# Patient Record
Sex: MALE | Race: WHITE | Employment: UNEMPLOYED | URBAN - METROPOLITAN AREA
[De-identification: names, ages, dates, MRNs, and addresses within clinical notes are randomized per-mention and may not be internally consistent; named-entity substitution may affect disease eponyms.]

---

## 2018-04-19 ENCOUNTER — OFFICE VISIT (OUTPATIENT)
Dept: ENT CLINIC | Age: 53
End: 2018-04-19
Payer: COMMERCIAL

## 2018-04-19 ENCOUNTER — PROCEDURE VISIT (OUTPATIENT)
Dept: AUDIOLOGY | Age: 53
End: 2018-04-19
Payer: COMMERCIAL

## 2018-04-19 VITALS
WEIGHT: 225 LBS | DIASTOLIC BLOOD PRESSURE: 84 MMHG | HEIGHT: 74 IN | OXYGEN SATURATION: 95 % | SYSTOLIC BLOOD PRESSURE: 121 MMHG | HEART RATE: 73 BPM | BODY MASS INDEX: 28.88 KG/M2

## 2018-04-19 DIAGNOSIS — J34.89 NASAL OBSTRUCTION: ICD-10-CM

## 2018-04-19 DIAGNOSIS — H91.93 BILATERAL HEARING LOSS, UNSPECIFIED HEARING LOSS TYPE: ICD-10-CM

## 2018-04-19 DIAGNOSIS — J30.1 CHRONIC SEASONAL ALLERGIC RHINITIS DUE TO POLLEN: Primary | ICD-10-CM

## 2018-04-19 DIAGNOSIS — J32.9 CHRONIC SINUSITIS, UNSPECIFIED LOCATION: ICD-10-CM

## 2018-04-19 DIAGNOSIS — J34.2 DNS (DEVIATED NASAL SEPTUM): ICD-10-CM

## 2018-04-19 DIAGNOSIS — H93.13 TINNITUS OF BOTH EARS: ICD-10-CM

## 2018-04-19 DIAGNOSIS — H93.13 TINNITUS OF BOTH EARS: Primary | ICD-10-CM

## 2018-04-19 PROCEDURE — 3017F COLORECTAL CA SCREEN DOC REV: CPT | Performed by: OTOLARYNGOLOGY

## 2018-04-19 PROCEDURE — G8427 DOCREV CUR MEDS BY ELIG CLIN: HCPCS | Performed by: OTOLARYNGOLOGY

## 2018-04-19 PROCEDURE — 92567 TYMPANOMETRY: CPT | Performed by: AUDIOLOGIST

## 2018-04-19 PROCEDURE — 99204 OFFICE O/P NEW MOD 45 MIN: CPT | Performed by: OTOLARYNGOLOGY

## 2018-04-19 PROCEDURE — G8419 CALC BMI OUT NRM PARAM NOF/U: HCPCS | Performed by: OTOLARYNGOLOGY

## 2018-04-19 PROCEDURE — 92557 COMPREHENSIVE HEARING TEST: CPT | Performed by: AUDIOLOGIST

## 2018-04-19 PROCEDURE — 1036F TOBACCO NON-USER: CPT | Performed by: OTOLARYNGOLOGY

## 2018-04-19 RX ORDER — FLUTICASONE PROPIONATE 50 MCG
2 SPRAY, SUSPENSION (ML) NASAL DAILY
Qty: 1 BOTTLE | Refills: 3 | Status: SHIPPED | OUTPATIENT
Start: 2018-04-19 | End: 2018-08-06 | Stop reason: SDUPTHER

## 2018-04-19 ASSESSMENT — ENCOUNTER SYMPTOMS
COLOR CHANGE: 0
EYES NEGATIVE: 1
RHINORRHEA: 1
GASTROINTESTINAL NEGATIVE: 1
SHORTNESS OF BREATH: 0
RESPIRATORY NEGATIVE: 1
ABDOMINAL PAIN: 0
SINUS PRESSURE: 1

## 2018-04-23 ENCOUNTER — TELEPHONE (OUTPATIENT)
Dept: ENT CLINIC | Age: 53
End: 2018-04-23

## 2018-05-24 ENCOUNTER — HOSPITAL ENCOUNTER (OUTPATIENT)
Dept: CT IMAGING | Age: 53
Discharge: HOME | End: 2018-05-26
Payer: COMMERCIAL

## 2018-05-24 DIAGNOSIS — J34.89 NASAL OBSTRUCTION: ICD-10-CM

## 2018-05-24 DIAGNOSIS — J34.2 DNS (DEVIATED NASAL SEPTUM): ICD-10-CM

## 2018-05-24 PROCEDURE — 70486 CT MAXILLOFACIAL W/O DYE: CPT

## 2018-05-25 ENCOUNTER — APPOINTMENT (OUTPATIENT)
Dept: GENERAL RADIOLOGY | Age: 53
End: 2018-05-25
Payer: COMMERCIAL

## 2018-05-25 ENCOUNTER — HOSPITAL ENCOUNTER (EMERGENCY)
Age: 53
Discharge: HOME | End: 2018-05-25
Payer: COMMERCIAL

## 2018-05-25 VITALS
DIASTOLIC BLOOD PRESSURE: 80 MMHG | SYSTOLIC BLOOD PRESSURE: 125 MMHG | TEMPERATURE: 98.2 F | RESPIRATION RATE: 18 BRPM | OXYGEN SATURATION: 96 % | HEART RATE: 96 BPM

## 2018-05-25 DIAGNOSIS — S90.32XA CONTUSION OF LEFT FOOT, INITIAL ENCOUNTER: Primary | ICD-10-CM

## 2018-05-25 PROCEDURE — 6370000000 HC RX 637 (ALT 250 FOR IP): Performed by: NURSE PRACTITIONER

## 2018-05-25 PROCEDURE — 99283 EMERGENCY DEPT VISIT LOW MDM: CPT

## 2018-05-25 PROCEDURE — 73610 X-RAY EXAM OF ANKLE: CPT

## 2018-05-25 PROCEDURE — 73630 X-RAY EXAM OF FOOT: CPT

## 2018-05-25 RX ORDER — ACETAMINOPHEN 325 MG/1
650 TABLET ORAL EVERY 6 HOURS PRN
Qty: 30 TABLET | Refills: 0 | Status: SHIPPED | OUTPATIENT
Start: 2018-05-25 | End: 2018-07-17

## 2018-05-25 RX ORDER — ACETAMINOPHEN 500 MG
1000 TABLET ORAL ONCE
Status: COMPLETED | OUTPATIENT
Start: 2018-05-25 | End: 2018-05-25

## 2018-05-25 RX ADMIN — ACETAMINOPHEN 1000 MG: 500 TABLET ORAL at 15:47

## 2018-05-25 ASSESSMENT — PAIN DESCRIPTION - ORIENTATION: ORIENTATION: LEFT

## 2018-05-25 ASSESSMENT — PAIN SCALES - GENERAL
PAINLEVEL_OUTOF10: 10
PAINLEVEL_OUTOF10: 10

## 2018-05-25 ASSESSMENT — PAIN DESCRIPTION - LOCATION: LOCATION: FOOT

## 2018-05-25 ASSESSMENT — PAIN DESCRIPTION - DESCRIPTORS: DESCRIPTORS: ACHING

## 2018-05-31 ENCOUNTER — OFFICE VISIT (OUTPATIENT)
Dept: ENT CLINIC | Age: 53
End: 2018-05-31
Payer: COMMERCIAL

## 2018-05-31 VITALS
OXYGEN SATURATION: 95 % | WEIGHT: 235 LBS | BODY MASS INDEX: 30.16 KG/M2 | SYSTOLIC BLOOD PRESSURE: 139 MMHG | DIASTOLIC BLOOD PRESSURE: 85 MMHG | HEIGHT: 74 IN | HEART RATE: 93 BPM

## 2018-05-31 DIAGNOSIS — R51.9 FACIAL PAIN: ICD-10-CM

## 2018-05-31 DIAGNOSIS — J34.89 NASAL OBSTRUCTION: ICD-10-CM

## 2018-05-31 DIAGNOSIS — J34.2 DNS (DEVIATED NASAL SEPTUM): ICD-10-CM

## 2018-05-31 DIAGNOSIS — J30.1 CHRONIC SEASONAL ALLERGIC RHINITIS DUE TO POLLEN: Primary | ICD-10-CM

## 2018-05-31 PROCEDURE — G8427 DOCREV CUR MEDS BY ELIG CLIN: HCPCS | Performed by: OTOLARYNGOLOGY

## 2018-05-31 PROCEDURE — G8417 CALC BMI ABV UP PARAM F/U: HCPCS | Performed by: OTOLARYNGOLOGY

## 2018-05-31 PROCEDURE — 1036F TOBACCO NON-USER: CPT | Performed by: OTOLARYNGOLOGY

## 2018-05-31 PROCEDURE — 99213 OFFICE O/P EST LOW 20 MIN: CPT | Performed by: OTOLARYNGOLOGY

## 2018-05-31 PROCEDURE — 3017F COLORECTAL CA SCREEN DOC REV: CPT | Performed by: OTOLARYNGOLOGY

## 2018-05-31 ASSESSMENT — ENCOUNTER SYMPTOMS
GASTROINTESTINAL NEGATIVE: 1
ABDOMINAL PAIN: 0
RHINORRHEA: 1
RESPIRATORY NEGATIVE: 1
COLOR CHANGE: 0
SINUS PRESSURE: 1
SHORTNESS OF BREATH: 0
EYES NEGATIVE: 1

## 2018-06-06 ENCOUNTER — TELEPHONE (OUTPATIENT)
Dept: ENT CLINIC | Age: 53
End: 2018-06-06

## 2018-07-16 RX ORDER — POLYVINYL ALCOHOL 14 MG/ML
SOLUTION/ DROPS OPHTHALMIC
COMMUNITY
Start: 2018-06-01

## 2018-07-16 RX ORDER — KETOCONAZOLE 20 MG/ML
SHAMPOO TOPICAL
COMMUNITY
Start: 2018-05-23 | End: 2021-05-18

## 2018-07-16 RX ORDER — CETIRIZINE HYDROCHLORIDE 10 MG/1
TABLET ORAL
COMMUNITY
Start: 2018-05-04 | End: 2018-07-17

## 2018-07-16 RX ORDER — MONTELUKAST SODIUM 10 MG/1
TABLET ORAL
COMMUNITY
Start: 2018-05-04 | End: 2018-07-17

## 2018-07-16 RX ORDER — SULFACETAMIDE/PREDNISOLONE 10 %-0.2 %
OINTMENT (GRAM) OPHTHALMIC (EYE)
COMMUNITY
Start: 2018-06-04 | End: 2018-07-17

## 2018-07-17 RX ORDER — ASCORBIC ACID 1000 MG
TABLET ORAL DAILY
COMMUNITY

## 2018-07-17 RX ORDER — ATORVASTATIN CALCIUM 40 MG/1
40 TABLET, FILM COATED ORAL DAILY
COMMUNITY
End: 2021-05-18

## 2018-07-17 RX ORDER — BUPROPION HYDROCHLORIDE 150 MG/1
150 TABLET, EXTENDED RELEASE ORAL 2 TIMES DAILY
COMMUNITY
End: 2021-05-18

## 2018-07-17 NOTE — PROGRESS NOTES
Abdiaziz 34 PRE-ADMISSION TESTING GENERAL INSTRUCTIONS- [facility]-phone number:[phone]    GENERAL INSTRUCTIONS  [x] Antibacterial Soap shower Night before and/or AM of Surgery  [] Abner wipe instruction sheet and wipes given. [x] Nothing by mouth after midnight, including gum, candy, mints, or water. [x] You may brush your teeth, gargle, but do NOT swallow water. []Hibiclens shower  the night before and the morning of surgery. Do not use             Hibiclens on your face or head. [x]No smoking, chewing tobacco, illegal drugs, or alcohol within 24 hours of your surgery. [x] Jewelry, valuables or body piercing's should not be brought to the hospital. All body and/or tongue piercing's must be removed prior to arriving to hospital.  ALL hair pins must be removed. [] Do not wear makeup, lotions, powders, deodorant. Nail polish as directed by the nurse. [x] Arrange transportation to and from the hospital.  Arrange for someone to be with you for the remainder of the day and for 24 hours after your procedure due to having had anesthesia. [x] Bring insurance card and photo ID.  [] Transfusion Bracelet: Please bring with you to hospital, day of surgery  [] Bring urine specimen day of surgery. Any small container is acceptable. [] Use inhalers the morning of surgery and bring with you to hospital.   []Bring copy of living will or healthcare power of  papers to be placed in your electronic record. [] CPAP/BI-PAP: Please bring your machine if you are to spend the night in the hospital.     ENDOSCOPY INSTRUCTIONS:   [] Bowel prep instructions reviewed. [] Nothing by mouth after midnight, including gum, candy, mints, or water.  [] You may brush your teeth, gargle, but do NOT swallow water. [] Do not wear makeup, lotions, powders, deodorant. Nail polish as directed by the nurse.   [] Arrange transportation to and from the hospital.  Arrange for someone to be with you for the remainder of the day due to having had anesthesia. PARKING INSTRUCTIONS:   · [x] Arrival Time 0700[x] Parking lot 1 is located on [facility] (the corner of [facility] and [facility]). To enter, press the button and the gate will lift. A free token will be provided to exit the lot. One car per patient is allowed to park in this lot. All other cars are to park on [address] [place] [address] Street either in the parking garage or the handicap lot. [] Free  parking is available on [address] [place] [address] Street. · [] To reach the [facility] lobby from [address] [place] [address], upon entering the hospital, take elevator B to the 3rd floor. EDUCATION INSTRUCTIONS:      [] Knee or hip replacement booklet & exercise pamphlets given. [] Braydon Rendon placed in chart. [] Pre-admission Testing educational folder given  [] Incentive Spirometry,coughing & deep breathing exercises reviewed. []Medication information sheet(s)   []Fluoroscopy-Xray used in surgery reviewed with patient. Educational pamphlet placed in chart. []Pain: Post-op pain is normal and to be expected. You will be asked to rate your pain from 0-10(a zero is not acceptable-education is needed). Your post-op pain goal is:  [] Ask your nurse for your pain medication. [] Joint camp offered. [] Joint replacement booklets given. []Other     MEDICATION INSTRUCTIONS:   [x]Bring a complete list of your medications, please write the last time you took the medicine, give this list to the nurse. [x] Take the following medications the morning of surgery with 1-2 ounces of water:   [x] Stop herbal supplements and vitamins 5 days before your surgery. [] DO NOT take any diabetic medicine the morning of surgery. Follow instructions for insulin the day before surgery. [] If you are diabetic and your blood sugar is low or you feel symptomatic, you may drink 1-2 ounces of apple juice or take a glucose tablet.   The morning of your procedure, you may call the pre-op area if you have concerns about your blood sugar [phone]. [] Use your inhalers the morning of surgery. Bring your emergency inhaler with you day of surgery. [] Follow physician instructions regarding any blood thinners you may be taking. WHAT TO EXPECT:  [x] The day of surgery you will be greeted and  checked in by the [employer] .  A nurse will greet you in accordance to the time you are needed in the pre-op area to prepare you for surgery. Please do not be discouraged if you are not greeted in the order you arrive as there are many variables that are involved in patient preparation. Your patience is greatly appreciated as you wait for your nurse. Please bring in items such as: books, magazines, newspapers, electronics, or any other items  to occupy your time in the waiting area. []  Delays may occur with surgery and staff will make a sincere effort to keep you informed of delays. If any delays occur with your procedure, we apologize ahead of time for your inconvenience as we recognize the value of your time.

## 2018-07-24 NOTE — H&P
sounds normal.   Abdominal: Soft. Bowel sounds are normal.   Neurological: He is alert and oriented to person, place, and time. Skin: Skin is warm and dry. Nursing note and vitals reviewed.              CT sinuses (my review)     yes - charu cells - bilateral  yes - tanya bullosa - left moderate  yes - Enlarged inferior turbinates - bilateral   yes - Obstructed OMC - bilateral   yes - maxillary sinus disease - bilateral, mild  no - frontal sinus disease - ,   Left frontal sinus              Absent -  no              Size - Small  Right frontal sinus              Absent -  no              Size - Small  no - sphenoid sinus disease - ,   yes - Deviated nasal septum - bilateral,  moderate   yes - Septal Spur - left,  moderate   no - posterior accessory os - ,                                Assessment:        Diagnosis Orders   1. Chronic seasonal allergic rhinitis due to pollen      2. DNS (deviated nasal septum)      3. Nasal obstruction      4. Facial pain                             Plan:      I recommend:     Functional endoscopic sinus surgery with bilateral maxillary, frontal and sphenoid antrostomies, submucous resection of inferior turbinates, septoplasty     The procedure risks and benefits were discussed with the patient and family. Pt and family understood and decided to proceed with the surgery.         Surgical risks include:    --Injury to the lining of the brain, meningitis, stroke and death - most common in the approach to the frontal sinus but may also occur in operations on the sphenoid and ethmoid sinuses  --Injury to the carotid artery - most common in sphenoid sinus surgery  --Injury to the orbit, eye and eye muscles.   Most commonly the medial rectus muscle is injured, this can cause double vision.  The optic nerve can be injured during sphenoid sinus surgery.   Injury to the Nasolacrimal Duct can cause tearing. -- Bleeding or air in the orbit.  If this happens, blindness can occur due to

## 2018-07-25 ENCOUNTER — ANESTHESIA (OUTPATIENT)
Dept: OPERATING ROOM | Age: 53
End: 2018-07-25
Payer: COMMERCIAL

## 2018-07-25 ENCOUNTER — ANESTHESIA EVENT (OUTPATIENT)
Dept: OPERATING ROOM | Age: 53
End: 2018-07-25
Payer: COMMERCIAL

## 2018-07-25 ENCOUNTER — HOSPITAL ENCOUNTER (OUTPATIENT)
Age: 53
Setting detail: OUTPATIENT SURGERY
Discharge: HOME | End: 2018-07-25
Attending: OTOLARYNGOLOGY | Admitting: OTOLARYNGOLOGY
Payer: COMMERCIAL

## 2018-07-25 VITALS
SYSTOLIC BLOOD PRESSURE: 135 MMHG | RESPIRATION RATE: 16 BRPM | WEIGHT: 186 LBS | HEIGHT: 74 IN | HEART RATE: 85 BPM | OXYGEN SATURATION: 98 % | DIASTOLIC BLOOD PRESSURE: 87 MMHG | TEMPERATURE: 97.2 F | BODY MASS INDEX: 23.87 KG/M2

## 2018-07-25 VITALS — OXYGEN SATURATION: 98 % | SYSTOLIC BLOOD PRESSURE: 146 MMHG | DIASTOLIC BLOOD PRESSURE: 100 MMHG

## 2018-07-25 DIAGNOSIS — J32.4 CHRONIC PANSINUSITIS: Primary | ICD-10-CM

## 2018-07-25 PROCEDURE — C1894 INTRO/SHEATH, NON-LASER: HCPCS | Performed by: OTOLARYNGOLOGY

## 2018-07-25 PROCEDURE — 3600000003 HC SURGERY LEVEL 3 BASE: Performed by: OTOLARYNGOLOGY

## 2018-07-25 PROCEDURE — 31298 NSL/SINS NDSC SURG FRNT&SPHN: CPT | Performed by: OTOLARYNGOLOGY

## 2018-07-25 PROCEDURE — 30140 RESECT INFERIOR TURBINATE: CPT | Performed by: OTOLARYNGOLOGY

## 2018-07-25 PROCEDURE — 6360000002 HC RX W HCPCS: Performed by: PHYSICIAN ASSISTANT

## 2018-07-25 PROCEDURE — 2500000003 HC RX 250 WO HCPCS: Performed by: PHYSICIAN ASSISTANT

## 2018-07-25 PROCEDURE — 3600000013 HC SURGERY LEVEL 3 ADDTL 15MIN: Performed by: OTOLARYNGOLOGY

## 2018-07-25 PROCEDURE — 6370000000 HC RX 637 (ALT 250 FOR IP): Performed by: OTOLARYNGOLOGY

## 2018-07-25 PROCEDURE — 2720000010 HC SURG SUPPLY STERILE: Performed by: OTOLARYNGOLOGY

## 2018-07-25 PROCEDURE — 6360000002 HC RX W HCPCS

## 2018-07-25 PROCEDURE — C1776 JOINT DEVICE (IMPLANTABLE): HCPCS | Performed by: OTOLARYNGOLOGY

## 2018-07-25 PROCEDURE — C1726 CATH, BAL DIL, NON-VASCULAR: HCPCS | Performed by: OTOLARYNGOLOGY

## 2018-07-25 PROCEDURE — 30520 REPAIR OF NASAL SEPTUM: CPT | Performed by: OTOLARYNGOLOGY

## 2018-07-25 PROCEDURE — 2580000003 HC RX 258: Performed by: PHYSICIAN ASSISTANT

## 2018-07-25 PROCEDURE — 2500000003 HC RX 250 WO HCPCS

## 2018-07-25 PROCEDURE — 7100000000 HC PACU RECOVERY - FIRST 15 MIN: Performed by: OTOLARYNGOLOGY

## 2018-07-25 PROCEDURE — 7100000011 HC PHASE II RECOVERY - ADDTL 15 MIN: Performed by: OTOLARYNGOLOGY

## 2018-07-25 PROCEDURE — 2500000003 HC RX 250 WO HCPCS: Performed by: OTOLARYNGOLOGY

## 2018-07-25 PROCEDURE — 7100000001 HC PACU RECOVERY - ADDTL 15 MIN: Performed by: OTOLARYNGOLOGY

## 2018-07-25 PROCEDURE — 3700000000 HC ANESTHESIA ATTENDED CARE: Performed by: OTOLARYNGOLOGY

## 2018-07-25 PROCEDURE — C2625 STENT, NON-COR, TEM W/DEL SY: HCPCS | Performed by: OTOLARYNGOLOGY

## 2018-07-25 PROCEDURE — 88311 DECALCIFY TISSUE: CPT

## 2018-07-25 PROCEDURE — 3700000001 HC ADD 15 MINUTES (ANESTHESIA): Performed by: OTOLARYNGOLOGY

## 2018-07-25 PROCEDURE — 7100000010 HC PHASE II RECOVERY - FIRST 15 MIN: Performed by: OTOLARYNGOLOGY

## 2018-07-25 PROCEDURE — 2709999900 HC NON-CHARGEABLE SUPPLY: Performed by: OTOLARYNGOLOGY

## 2018-07-25 PROCEDURE — 88305 TISSUE EXAM BY PATHOLOGIST: CPT

## 2018-07-25 PROCEDURE — 31295 NSL/SINS NDSC SURG MAX SINS: CPT | Performed by: OTOLARYNGOLOGY

## 2018-07-25 DEVICE — PROPEL MINI SINUS IMPLANT
Type: IMPLANTABLE DEVICE | Site: NOSE | Status: FUNCTIONAL
Brand: PROPEL MINI

## 2018-07-25 DEVICE — PROPEL CONTOUR SINUS IMPLANT
Type: IMPLANTABLE DEVICE | Site: NOSE | Status: FUNCTIONAL
Brand: PROPEL CONTOUR

## 2018-07-25 RX ORDER — SODIUM CHLORIDE 0.9 % (FLUSH) 0.9 %
10 SYRINGE (ML) INJECTION PRN
Status: DISCONTINUED | OUTPATIENT
Start: 2018-07-25 | End: 2018-07-25 | Stop reason: HOSPADM

## 2018-07-25 RX ORDER — GLYCOPYRROLATE 1 MG/5 ML
SYRINGE (ML) INTRAVENOUS PRN
Status: DISCONTINUED | OUTPATIENT
Start: 2018-07-25 | End: 2018-07-25 | Stop reason: SDUPTHER

## 2018-07-25 RX ORDER — DEXAMETHASONE SODIUM PHOSPHATE 10 MG/ML
INJECTION INTRAMUSCULAR; INTRAVENOUS PRN
Status: DISCONTINUED | OUTPATIENT
Start: 2018-07-25 | End: 2018-07-25 | Stop reason: SDUPTHER

## 2018-07-25 RX ORDER — ROCURONIUM BROMIDE 10 MG/ML
INJECTION, SOLUTION INTRAVENOUS PRN
Status: DISCONTINUED | OUTPATIENT
Start: 2018-07-25 | End: 2018-07-25 | Stop reason: SDUPTHER

## 2018-07-25 RX ORDER — PROPOFOL 10 MG/ML
INJECTION, EMULSION INTRAVENOUS PRN
Status: DISCONTINUED | OUTPATIENT
Start: 2018-07-25 | End: 2018-07-25 | Stop reason: SDUPTHER

## 2018-07-25 RX ORDER — HYDROCODONE BITARTRATE AND ACETAMINOPHEN 5; 325 MG/1; MG/1
1 TABLET ORAL EVERY 6 HOURS PRN
Qty: 20 TABLET | Refills: 0 | Status: SHIPPED | OUTPATIENT
Start: 2018-07-25 | End: 2018-07-30

## 2018-07-25 RX ORDER — MEPERIDINE HYDROCHLORIDE 50 MG/ML
12.5 INJECTION INTRAMUSCULAR; INTRAVENOUS; SUBCUTANEOUS EVERY 5 MIN PRN
Status: DISCONTINUED | OUTPATIENT
Start: 2018-07-25 | End: 2018-07-25 | Stop reason: HOSPADM

## 2018-07-25 RX ORDER — HYDROCODONE BITARTRATE AND ACETAMINOPHEN 5; 325 MG/1; MG/1
1 TABLET ORAL
Status: COMPLETED | OUTPATIENT
Start: 2018-07-25 | End: 2018-07-25

## 2018-07-25 RX ORDER — MIDAZOLAM HYDROCHLORIDE 1 MG/ML
INJECTION INTRAMUSCULAR; INTRAVENOUS PRN
Status: DISCONTINUED | OUTPATIENT
Start: 2018-07-25 | End: 2018-07-25 | Stop reason: SDUPTHER

## 2018-07-25 RX ORDER — OXYMETAZOLINE HYDROCHLORIDE 0.05 G/100ML
2 SPRAY NASAL
Status: DISCONTINUED | OUTPATIENT
Start: 2018-07-25 | End: 2018-07-25 | Stop reason: HOSPADM

## 2018-07-25 RX ORDER — ONDANSETRON 2 MG/ML
INJECTION INTRAMUSCULAR; INTRAVENOUS PRN
Status: DISCONTINUED | OUTPATIENT
Start: 2018-07-25 | End: 2018-07-25 | Stop reason: SDUPTHER

## 2018-07-25 RX ORDER — SODIUM CHLORIDE 0.9 % (FLUSH) 0.9 %
10 SYRINGE (ML) INJECTION EVERY 12 HOURS SCHEDULED
Status: DISCONTINUED | OUTPATIENT
Start: 2018-07-25 | End: 2018-07-25 | Stop reason: HOSPADM

## 2018-07-25 RX ORDER — PROMETHAZINE HYDROCHLORIDE 25 MG/ML
25 INJECTION, SOLUTION INTRAMUSCULAR; INTRAVENOUS PRN
Status: DISCONTINUED | OUTPATIENT
Start: 2018-07-25 | End: 2018-07-25 | Stop reason: HOSPADM

## 2018-07-25 RX ORDER — SODIUM CHLORIDE 9 MG/ML
INJECTION, SOLUTION INTRAVENOUS CONTINUOUS PRN
Status: DISCONTINUED | OUTPATIENT
Start: 2018-07-25 | End: 2018-07-25 | Stop reason: SDUPTHER

## 2018-07-25 RX ORDER — NEOSTIGMINE METHYLSULFATE 1 MG/ML
INJECTION, SOLUTION INTRAVENOUS PRN
Status: DISCONTINUED | OUTPATIENT
Start: 2018-07-25 | End: 2018-07-25 | Stop reason: SDUPTHER

## 2018-07-25 RX ORDER — FENTANYL CITRATE 50 UG/ML
INJECTION, SOLUTION INTRAMUSCULAR; INTRAVENOUS PRN
Status: DISCONTINUED | OUTPATIENT
Start: 2018-07-25 | End: 2018-07-25 | Stop reason: SDUPTHER

## 2018-07-25 RX ORDER — LIDOCAINE HYDROCHLORIDE AND EPINEPHRINE 10; 10 MG/ML; UG/ML
INJECTION, SOLUTION INFILTRATION; PERINEURAL PRN
Status: DISCONTINUED | OUTPATIENT
Start: 2018-07-25 | End: 2018-07-25 | Stop reason: HOSPADM

## 2018-07-25 RX ORDER — LABETALOL HYDROCHLORIDE 5 MG/ML
5 INJECTION, SOLUTION INTRAVENOUS EVERY 10 MIN PRN
Status: DISCONTINUED | OUTPATIENT
Start: 2018-07-25 | End: 2018-07-25 | Stop reason: HOSPADM

## 2018-07-25 RX ORDER — SODIUM CHLORIDE, SODIUM LACTATE, POTASSIUM CHLORIDE, CALCIUM CHLORIDE 600; 310; 30; 20 MG/100ML; MG/100ML; MG/100ML; MG/100ML
INJECTION, SOLUTION INTRAVENOUS CONTINUOUS PRN
Status: DISCONTINUED | OUTPATIENT
Start: 2018-07-25 | End: 2018-07-25 | Stop reason: SDUPTHER

## 2018-07-25 RX ORDER — LABETALOL HYDROCHLORIDE 5 MG/ML
INJECTION, SOLUTION INTRAVENOUS PRN
Status: DISCONTINUED | OUTPATIENT
Start: 2018-07-25 | End: 2018-07-25 | Stop reason: SDUPTHER

## 2018-07-25 RX ADMIN — Medication 0.6 MG: at 11:10

## 2018-07-25 RX ADMIN — FENTANYL CITRATE 50 MCG: 50 INJECTION, SOLUTION INTRAMUSCULAR; INTRAVENOUS at 10:27

## 2018-07-25 RX ADMIN — ONDANSETRON 4 MG: 2 INJECTION INTRAMUSCULAR; INTRAVENOUS at 11:06

## 2018-07-25 RX ADMIN — ROCURONIUM BROMIDE 40 MG: 10 INJECTION, SOLUTION INTRAVENOUS at 08:54

## 2018-07-25 RX ADMIN — FENTANYL CITRATE 50 MCG: 50 INJECTION, SOLUTION INTRAMUSCULAR; INTRAVENOUS at 11:15

## 2018-07-25 RX ADMIN — FENTANYL CITRATE 50 MCG: 50 INJECTION, SOLUTION INTRAMUSCULAR; INTRAVENOUS at 09:10

## 2018-07-25 RX ADMIN — PROPOFOL 30 MG: 10 INJECTION, EMULSION INTRAVENOUS at 11:15

## 2018-07-25 RX ADMIN — LIDOCAINE HYDROCHLORIDE 80 MG: 20 INJECTION, SOLUTION INTRAVENOUS at 08:54

## 2018-07-25 RX ADMIN — MIDAZOLAM 2 MG: 1 INJECTION INTRAMUSCULAR; INTRAVENOUS at 08:30

## 2018-07-25 RX ADMIN — Medication 3 MG: at 11:10

## 2018-07-25 RX ADMIN — FENTANYL CITRATE 50 MCG: 50 INJECTION, SOLUTION INTRAMUSCULAR; INTRAVENOUS at 08:54

## 2018-07-25 RX ADMIN — Medication 0.2 MG: at 08:42

## 2018-07-25 RX ADMIN — PROPOFOL 170 MG: 10 INJECTION, EMULSION INTRAVENOUS at 08:54

## 2018-07-25 RX ADMIN — LABETALOL HYDROCHLORIDE 5 MG: 5 INJECTION, SOLUTION INTRAVENOUS at 09:10

## 2018-07-25 RX ADMIN — SODIUM CHLORIDE, POTASSIUM CHLORIDE, SODIUM LACTATE AND CALCIUM CHLORIDE: 600; 310; 30; 20 INJECTION, SOLUTION INTRAVENOUS at 11:00

## 2018-07-25 RX ADMIN — HYDROCODONE BITARTRATE AND ACETAMINOPHEN 1 TABLET: 5; 325 TABLET ORAL at 12:38

## 2018-07-25 RX ADMIN — SODIUM CHLORIDE: 9 INJECTION, SOLUTION INTRAVENOUS at 08:30

## 2018-07-25 RX ADMIN — Medication 0.2 MG: at 08:30

## 2018-07-25 RX ADMIN — ROCURONIUM BROMIDE 10 MG: 10 INJECTION, SOLUTION INTRAVENOUS at 09:28

## 2018-07-25 RX ADMIN — DEXAMETHASONE SODIUM PHOSPHATE 10 MG: 10 INJECTION INTRAMUSCULAR; INTRAVENOUS at 08:54

## 2018-07-25 ASSESSMENT — PAIN DESCRIPTION - LOCATION
LOCATION: NOSE

## 2018-07-25 ASSESSMENT — PULMONARY FUNCTION TESTS
PIF_VALUE: 17
PIF_VALUE: 18
PIF_VALUE: 1
PIF_VALUE: 17
PIF_VALUE: 13
PIF_VALUE: 0
PIF_VALUE: 16
PIF_VALUE: 19
PIF_VALUE: 18
PIF_VALUE: 17
PIF_VALUE: 14
PIF_VALUE: 17
PIF_VALUE: 18
PIF_VALUE: 18
PIF_VALUE: 20
PIF_VALUE: 0
PIF_VALUE: 17
PIF_VALUE: 17
PIF_VALUE: 19
PIF_VALUE: 18
PIF_VALUE: 19
PIF_VALUE: 18
PIF_VALUE: 17
PIF_VALUE: 17
PIF_VALUE: 18
PIF_VALUE: 17
PIF_VALUE: 18
PIF_VALUE: 17
PIF_VALUE: 17
PIF_VALUE: 1
PIF_VALUE: 16
PIF_VALUE: 17
PIF_VALUE: 0
PIF_VALUE: 17
PIF_VALUE: 18
PIF_VALUE: 17
PIF_VALUE: 0
PIF_VALUE: 26
PIF_VALUE: 18
PIF_VALUE: 18
PIF_VALUE: 16
PIF_VALUE: 1
PIF_VALUE: 17
PIF_VALUE: 13
PIF_VALUE: 17
PIF_VALUE: 18
PIF_VALUE: 1
PIF_VALUE: 18
PIF_VALUE: 17
PIF_VALUE: 15
PIF_VALUE: 17
PIF_VALUE: 18
PIF_VALUE: 16
PIF_VALUE: 17
PIF_VALUE: 20
PIF_VALUE: 18
PIF_VALUE: 18
PIF_VALUE: 5
PIF_VALUE: 17
PIF_VALUE: 13
PIF_VALUE: 18
PIF_VALUE: 17
PIF_VALUE: 13
PIF_VALUE: 1
PIF_VALUE: 21
PIF_VALUE: 18
PIF_VALUE: 18
PIF_VALUE: 13
PIF_VALUE: 17
PIF_VALUE: 21
PIF_VALUE: 17
PIF_VALUE: 17
PIF_VALUE: 12
PIF_VALUE: 22
PIF_VALUE: 17
PIF_VALUE: 17
PIF_VALUE: 13
PIF_VALUE: 17
PIF_VALUE: 18
PIF_VALUE: 17
PIF_VALUE: 18
PIF_VALUE: 13
PIF_VALUE: 17
PIF_VALUE: 17
PIF_VALUE: 20
PIF_VALUE: 17
PIF_VALUE: 18
PIF_VALUE: 17
PIF_VALUE: 20
PIF_VALUE: 0
PIF_VALUE: 20
PIF_VALUE: 18
PIF_VALUE: 20
PIF_VALUE: 13
PIF_VALUE: 16
PIF_VALUE: 18
PIF_VALUE: 18
PIF_VALUE: 13
PIF_VALUE: 17
PIF_VALUE: 18
PIF_VALUE: 18
PIF_VALUE: 17
PIF_VALUE: 7
PIF_VALUE: 18
PIF_VALUE: 18
PIF_VALUE: 13
PIF_VALUE: 17
PIF_VALUE: 18
PIF_VALUE: 18
PIF_VALUE: 17
PIF_VALUE: 20
PIF_VALUE: 17
PIF_VALUE: 13
PIF_VALUE: 17
PIF_VALUE: 18
PIF_VALUE: 18
PIF_VALUE: 17
PIF_VALUE: 1
PIF_VALUE: 1
PIF_VALUE: 17
PIF_VALUE: 17
PIF_VALUE: 18
PIF_VALUE: 13
PIF_VALUE: 17
PIF_VALUE: 17
PIF_VALUE: 18
PIF_VALUE: 13
PIF_VALUE: 1
PIF_VALUE: 18
PIF_VALUE: 17
PIF_VALUE: 17
PIF_VALUE: 18
PIF_VALUE: 18
PIF_VALUE: 17
PIF_VALUE: 17
PIF_VALUE: 20
PIF_VALUE: 18
PIF_VALUE: 17

## 2018-07-25 ASSESSMENT — PAIN DESCRIPTION - DESCRIPTORS
DESCRIPTORS: ACHING;PRESSURE
DESCRIPTORS: ACHING;PRESSURE
DESCRIPTORS: DULL;HEADACHE;PRESSURE

## 2018-07-25 ASSESSMENT — PAIN - FUNCTIONAL ASSESSMENT: PAIN_FUNCTIONAL_ASSESSMENT: 0-10

## 2018-07-25 ASSESSMENT — PAIN SCALES - GENERAL
PAINLEVEL_OUTOF10: 3
PAINLEVEL_OUTOF10: 0
PAINLEVEL_OUTOF10: 0
PAINLEVEL_OUTOF10: 6
PAINLEVEL_OUTOF10: 6

## 2018-07-25 ASSESSMENT — PAIN DESCRIPTION - PAIN TYPE
TYPE: SURGICAL PAIN

## 2018-07-25 ASSESSMENT — PAIN DESCRIPTION - FREQUENCY
FREQUENCY: CONTINUOUS
FREQUENCY: CONTINUOUS
FREQUENCY: INTERMITTENT

## 2018-07-25 ASSESSMENT — PAIN DESCRIPTION - PROGRESSION: CLINICAL_PROGRESSION: GRADUALLY IMPROVING

## 2018-07-25 NOTE — ANESTHESIA PRE PROCEDURE
Department of Anesthesiology  Preprocedure Note       Name:  Yaquelin Taylor   Age:  45 y.o.  :  1965                                          MRN:  [id number]         Date:  2018      Surgeon: Ekaterina Arriaga):  Mikel Herrera DO    Procedure: Procedure(s):  SINUS ENDOSCOPY FUNCTIONAL SURGERY SEPTOPLASTY SUBMOUCOS RESECTION OF INTERFERIOR TURBNIATES    Medications prior to admission:   Prior to Admission medications    Medication Sig Start Date End Date Taking? Authorizing Provider   buPROPion (WELLBUTRIN SR) 150 MG extended release tablet Take 150 mg by mouth 2 times daily Take morning of surgery with a sip of water   Yes Historical Provider, MD   atorvastatin (LIPITOR) 40 MG tablet Take 40 mg by mouth daily   Yes Historical Provider, MD   Cholecalciferol (VITAMIN D3) 5000 units TABS Take by mouth daily   Yes Historical Provider, MD   Multiple Vitamins-Minerals (CENTRUM SILVER PO) Take by mouth STOP PREOP MED   Yes Historical Provider, MD   Ginkgo Biloba 40 MG TABS Take by mouth STOP PREOP MED   Yes Historical Provider, MD   ARTIFICIAL TEARS 1.4 % ophthalmic solution  18  Yes Historical Provider, MD   varenicline (CHANTIX CONTINUING MONTH JOE) 1 MG tablet Take 1 tablet by mouth 2 times daily. 12  Yes Johanna Manning MD   ketoconazole (NIZORAL) 2 % shampoo Apply topically  18   Historical Provider, MD   fluticasone (FLONASE) 50 MCG/ACT nasal spray 2 sprays by Nasal route daily 2 sprays in each nostril daily 18   Mikel Herrera DO   Diphenhydramine-APAP, sleep, (TYLENOL PM EXTRA STRENGTH)  MG TABS Take 1 tablet by mouth nightly as needed. otc med     Historical Provider, MD   Ginseng 250 MG CAPS Take 1 capsule by mouth daily.  otc med     Historical Provider, MD       Current medications:    Current Facility-Administered Medications   Medication Dose Route Frequency Provider Last Rate Last Dose    sodium chloride flush 0.9 % injection 10 mL  10 mL Intravenous 2 times per day Ene Herrera DO        sodium chloride flush 0.9 % injection 10 mL  10 mL Intravenous PRN Ene Herrera DO        oxymetazoline (AFRIN) 0.05 % nasal spray 2 spray  2 spray Each Nare On Call to Willie Corrigan DO           Allergies: Allergies   Allergen Reactions    Ibuprofen Other (See Comments)     Blood in stool       Problem List:    Patient Active Problem List   Diagnosis Code    Heel spur M77.30    URI (upper respiratory infection) J06.9    Heel spur M77.30    Vitamin D deficiency E55.9    Tobacco abuse Z72.0       Past Medical History:        Diagnosis Date    Anxiety     Vitamin D deficiency        Past Surgical History:        Procedure Laterality Date    COLONOSCOPY      EYE SURGERY  1988    vision lazy eye    HEEL SPUR SURGERY  02/15/2011    rt heel       Social History:    Social History   Substance Use Topics    Smoking status: Former Smoker     Packs/day: 0.50     Years: 20.00     Types: Cigarettes     Quit date: 11/19/2017    Smokeless tobacco: Never Used      Comment: stopped 2 weeks    Alcohol use No                                Counseling given: Not Answered      Vital Signs (Current):   Vitals:    07/17/18 1200 07/25/18 0753   BP:  119/78   Pulse:  82   Resp:  18   Temp:  98.6 °F (37 °C)   TempSrc:  Temporal   SpO2:  97%   Weight: 186 lb (84.4 kg) 186 lb (84.4 kg)   Height: 6' 2\" (1.88 m) 6' 2\" (1.88 m)                                              BP Readings from Last 3 Encounters:   07/25/18 119/78   05/31/18 139/85   05/25/18 125/80       NPO Status:                                                                                 BMI:   Wt Readings from Last 3 Encounters:   07/25/18 186 lb (84.4 kg)   05/31/18 235 lb (106.6 kg)   04/19/18 225 lb (102.1 kg)     Body mass index is 23.88 kg/m².     CBC:   Lab Results   Component Value Date    WBC 15.0 02/07/2012    RBC 4.78 02/07/2012    HGB 15.2 02/07/2012    HCT 45.5 02/07/2012    MCV

## 2018-07-25 NOTE — PROGRESS NOTES
Visit AVS printed, reviewed with pt & family member, pt & family member verbalizes understanding by teach back.  D/C folder given to family member, AVS hard copy signature sheet retained and filed in medical record. -Sofia Baeza RN

## 2018-07-25 NOTE — INTERVAL H&P NOTE
H&P reviewed, patient examined, no changes. Questions answered.     Electronically signed by Shahrzad Csotello DO on 7/25/18 at 8:14 AM

## 2018-07-26 NOTE — OP NOTE
frontal sinus. The balloon was then retracted and the frontal sinus seeker was withdrawn. Maxillary   LEFT:  With the maxillary sinuses, starting on the left side, the patient was found to have an accessory os. The patient was also found to have a tanya bullosa which was reduced to allow access into the Osteomeatal complex. The maxillary sinus guidewire was used to go to the posterior aspect of the uncinate. The tip of the guidewire was placed just lateral to the uncinate process and the guidewire was advanced. Once the guidewire was placed across the ostium, visualization was found by light in the left maxillary sinus. The balloon was advanced over the ostium and on the inflation to 12 mm of pressure. The balloon did open up the uncinate process as well and the bowing of this uncinate process medially showed that we were in the appropriate position. This was allowed to hold for 3 seconds and then taken down. The patient's sinus was then irrigated out as well with 180 mL of normal saline and then the balloon and guidewire were withdrawn. The maxillary seeker was removed from the nose. RIGHT:  With the maxillary sinuses, starting on the right side, the patient was found to have an accessory os. The patient was also found to have a tnaya bullosa which was reduced to allow access into the Osteomeatal complex. The maxillary sinus guidewire was used to go to the posterior aspect of the uncinate. The tip of the guidewire was placed just lateral to the uncinate process and the guidewire was advanced. Once the guidewire was placed across the ostium, visualization was found by light in the right maxillary sinus. The balloon was advanced over the ostium and on the inflation to 12 mm of pressure. The balloon did open up the uncinate process as well and the bowing of this uncinate process medially showed that we were in the appropriate position. This was allowed to hold for 3 seconds and then taken down.  The patient's sinus was then irrigated out as well with 180 mL of normal saline and then the balloon and guidewire were withdrawn. The maxillary seeker was removed from the nose. SMRITS  The bilateral inferior turbinates were medialized with a boies elevator. Using an arthrocare Reflex wand, a small puncture hole was made with the instrument in the anterior portion of the left inferior turbinate. The instrument was advanced along the bone of the turbinate, elevating the periostium from the mucosa. The mucosa was then ablated until a thin mucosal layer was left over the bone. A Fanning motion was used to cover the entire turbinate. The turbinate was then lateralized with a boies elevator. Attention was turned to the right side. Using an arthrocare Reflex wand, a small puncture hole was made with the instrument in the anterior portion of the left inferior turbinate. The instrument was advanced along the bone of the turbinate, elevating the periostium from the mucosa. The mucosa was then ablated until a thin mucosal layer was left over the bone. A Fanning motion was used to cover the entire turbinate. The turbinate was then lateralized with a boies elevator. Dressing  Propel implant  LEFT   At the completion of these procedures, the patient's middle turbinate was medialized with a Propel Contour and Mini Middle turbinate implant to hold the middle turbinate open. RIGHT  At the completion of these procedures, the patient's middle turbinate was medialized with a Propel Contour and Mini Middle turbinate implant to hold the middle turbinate open. The middle turbinates were not then packed with a Sinu-Foam gel. The patient was turned back to Anesthesia for appropriate awakening.       Electronically signed by Dallin Castro DO on 7/27/18 at 2:15 PM

## 2018-07-31 ENCOUNTER — OFFICE VISIT (OUTPATIENT)
Dept: ENT CLINIC | Age: 53
End: 2018-07-31

## 2018-07-31 VITALS
SYSTOLIC BLOOD PRESSURE: 119 MMHG | DIASTOLIC BLOOD PRESSURE: 67 MMHG | BODY MASS INDEX: 23.87 KG/M2 | OXYGEN SATURATION: 98 % | HEART RATE: 89 BPM | WEIGHT: 186 LBS | HEIGHT: 74 IN

## 2018-07-31 DIAGNOSIS — Z98.890 STATUS POST FUNCTIONAL ENDOSCOPIC SINUS SURGERY (FESS): Primary | ICD-10-CM

## 2018-07-31 PROCEDURE — 99024 POSTOP FOLLOW-UP VISIT: CPT | Performed by: OTOLARYNGOLOGY

## 2018-07-31 NOTE — PROGRESS NOTES
7/31/2018    Subjective:    Patient ID:  Da Baum is a 52 y.o. male. HPI Comments: Pt returns today for followup, s/p FESS, Septo, SMRITS and review of path report. Pt is  doing well. There are not problems from the surgical site. Pt is congested from splints. No bleeding. Review of Systems   HENT: Positive for congestion and rhinorrhea. All other systems reviewed and are negative. Objective:    Physical Exam   Constitutional: She is oriented to person, place, and time. She appears well-developed and well-nourished. HENT:   Head: Normocephalic and atraumatic. Right Ear: Hearing, tympanic membrane, external ear and ear canal normal.   Left Ear: Hearing, tympanic membrane, external ear and ear canal normal.   Nose: Rhinorrhea present. Mouth/Throat: Uvula is midline, oropharynx is clear and moist and mucous membranes are normal.   Septal splints in place - removed with no difficulty. Septum is straight    Crusting removed from both nasal cavities. Eyes: Conjunctivae and EOM are normal. Pupils are equal, round, and reactive to light. Neck: Normal range of motion. Neck supple. Cardiovascular: Normal rate, regular rhythm and normal heart sounds. Pulmonary/Chest: Effort normal and breath sounds normal.   Abdominal: Soft. Bowel sounds are normal.   Neurological: She is alert and oriented to person, place, and time. Vitals reviewed. Assessment:       Diagnosis Orders   1. Status post functional endoscopic sinus surgery (FESS)           Plan:      I want patient to follow up in 4 months, continue saline rinses . No blowing nose only sniffing. Patient may call if they feel they're getting a sinus infection and I will start him   on antibiotic.     Follow up in 4 month(s)

## 2018-08-02 ENCOUNTER — TELEPHONE (OUTPATIENT)
Dept: ENT CLINIC | Age: 53
End: 2018-08-02

## 2018-08-07 RX ORDER — FLUTICASONE PROPIONATE 50 MCG
SPRAY, SUSPENSION (ML) NASAL
Qty: 1 BOTTLE | Refills: 3 | Status: SHIPPED | OUTPATIENT
Start: 2018-08-07 | End: 2021-05-18

## 2018-08-24 ENCOUNTER — TELEPHONE (OUTPATIENT)
Dept: CASE MANAGEMENT | Age: 53
End: 2018-08-24

## 2018-08-27 RX ORDER — SOD CHLOR,BICARB/SQUEEZ BOTTLE
1 PACKET, WITH RINSE DEVICE NASAL DAILY
Qty: 1 KIT | Refills: 0 | COMMUNITY
Start: 2018-08-27 | End: 2021-05-18 | Stop reason: CLARIF

## 2018-09-10 ENCOUNTER — TELEPHONE (OUTPATIENT)
Dept: CASE MANAGEMENT | Age: 53
End: 2018-09-10

## 2018-09-10 ENCOUNTER — HOSPITAL ENCOUNTER (OUTPATIENT)
Dept: CT IMAGING | Age: 53
Discharge: HOME | End: 2018-09-12
Payer: COMMERCIAL

## 2018-09-10 DIAGNOSIS — J32.9 SINUSITIS, UNSPECIFIED CHRONICITY, UNSPECIFIED LOCATION: ICD-10-CM

## 2018-09-10 PROCEDURE — G0297 LDCT FOR LUNG CA SCREEN: HCPCS

## 2018-09-11 ENCOUNTER — TELEPHONE (OUTPATIENT)
Dept: CASE MANAGEMENT | Age: 53
End: 2018-09-11

## 2018-09-12 VITALS — BODY MASS INDEX: 24.9 KG/M2 | WEIGHT: 194 LBS | HEIGHT: 74 IN

## 2018-12-13 ENCOUNTER — HOSPITAL ENCOUNTER (OUTPATIENT)
Dept: ULTRASOUND IMAGING | Age: 53
Discharge: HOME | End: 2018-12-15
Payer: COMMERCIAL

## 2018-12-13 DIAGNOSIS — R59.1 LYMPHADENOPATHY: ICD-10-CM

## 2018-12-13 PROCEDURE — 76536 US EXAM OF HEAD AND NECK: CPT

## 2019-05-31 ENCOUNTER — HOSPITAL ENCOUNTER (OUTPATIENT)
Dept: PULMONOLOGY | Age: 54
Discharge: HOME | End: 2019-05-31
Payer: COMMERCIAL

## 2019-05-31 PROCEDURE — 94060 EVALUATION OF WHEEZING: CPT

## 2019-08-22 ENCOUNTER — TELEPHONE (OUTPATIENT)
Dept: CASE MANAGEMENT | Age: 54
End: 2019-08-22

## 2019-09-20 ENCOUNTER — HOSPITAL ENCOUNTER (OUTPATIENT)
Dept: INTERVENTIONAL RADIOLOGY/VASCULAR | Age: 54
Discharge: HOME | End: 2019-09-22
Payer: COMMERCIAL

## 2019-09-20 DIAGNOSIS — R20.9 COLD FEET: ICD-10-CM

## 2019-09-20 PROCEDURE — 93922 UPR/L XTREMITY ART 2 LEVELS: CPT

## 2020-10-28 ENCOUNTER — HOSPITAL ENCOUNTER (OUTPATIENT)
Dept: CT IMAGING | Age: 55
Discharge: HOME | End: 2020-10-30
Payer: COMMERCIAL

## 2020-10-28 PROCEDURE — G0297 LDCT FOR LUNG CA SCREEN: HCPCS

## 2020-10-29 ENCOUNTER — TELEPHONE (OUTPATIENT)
Dept: CASE MANAGEMENT | Age: 55
End: 2020-10-29

## 2020-10-29 NOTE — TELEPHONE ENCOUNTER
No call, encounter opened to process CT Lung Screening. CT Lung Screen: 10/28/2020  Impression    1. Enlarging right upper lobe 6 mm pleural-based nodule, previously 4 mm. Follow-up low-dose chest CT recommended in 3 months.         LUNG RADS:    Per ACR Lung-RADS Version 1.1         Category 4A, Suspicious (Findings for which additional diagnostic testing is    recommended).         Management: 3 Month LDCT; PET/CT may be used when there is a > 8mm solid    component.         RECOMMENDATIONS:    If you would like to [facility] your [facility], please contact the Nurse Navigator at    [phone]. Pack years: 27    Social History     Tobacco Use  Smoking Status: Former Smoker   Start Date:    Quit Date: 8/13/2018   Types: Cigarettes   Packs/Day: 1.00   Years: 30.00   Pack Years: 30   Smokeless Tobacco: Never Used         Results letter sent to patient via my chart or mailed.      [facility]

## 2021-02-18 ENCOUNTER — HOSPITAL ENCOUNTER (OUTPATIENT)
Dept: CT IMAGING | Age: 56
Discharge: HOME | End: 2021-02-20
Payer: COMMERCIAL

## 2021-02-18 ENCOUNTER — HOSPITAL ENCOUNTER (OUTPATIENT)
Age: 56
Discharge: HOME | End: 2021-02-20
Payer: COMMERCIAL

## 2021-02-18 DIAGNOSIS — R91.1 LUNG NODULE: ICD-10-CM

## 2021-02-18 PROCEDURE — 71250 CT THORAX DX C-: CPT

## 2021-04-13 ENCOUNTER — HOSPITAL ENCOUNTER (OUTPATIENT)
Dept: PET IMAGING | Age: 56
Discharge: HOME | End: 2021-04-15
Payer: COMMERCIAL

## 2021-04-13 DIAGNOSIS — R91.1 SOLITARY PULMONARY NODULE: ICD-10-CM

## 2021-04-13 LAB — METER GLUCOSE: 99 MG/DL (ref 74–99)

## 2021-04-13 PROCEDURE — A9552 F18 FDG: HCPCS | Performed by: RADIOLOGY

## 2021-04-13 PROCEDURE — 82962 GLUCOSE BLOOD TEST: CPT

## 2021-04-13 PROCEDURE — 78815 PET IMAGE W/CT SKULL-THIGH: CPT

## 2021-04-13 PROCEDURE — 3430000000 HC RX DIAGNOSTIC RADIOPHARMACEUTICAL: Performed by: RADIOLOGY

## 2021-04-13 RX ORDER — FLUDEOXYGLUCOSE F 18 200 MCI/ML
15 INJECTION, SOLUTION INTRAVENOUS
Status: COMPLETED | OUTPATIENT
Start: 2021-04-13 | End: 2021-04-13

## 2021-04-13 RX ADMIN — FLUDEOXYGLUCOSE F 18 15 MILLICURIE: 200 INJECTION, SOLUTION INTRAVENOUS at 12:50

## 2021-04-16 RX ORDER — CALCIUM CARBONATE/VITAMIN D3 600 MG-20
2000 TABLET ORAL DAILY
COMMUNITY
Start: 2021-04-09

## 2021-04-16 RX ORDER — MOMETASONE FUROATE AND FORMOTEROL FUMARATE DIHYDRATE 200; 5 UG/1; UG/1
1 AEROSOL RESPIRATORY (INHALATION) DAILY
COMMUNITY
Start: 2021-04-09

## 2021-05-18 ENCOUNTER — INITIAL CONSULT (OUTPATIENT)
Dept: CARDIOTHORACIC SURGERY | Age: 56
End: 2021-05-18
Payer: COMMERCIAL

## 2021-05-18 ENCOUNTER — PREP FOR PROCEDURE (OUTPATIENT)
Dept: CARDIOTHORACIC SURGERY | Age: 56
End: 2021-05-18

## 2021-05-18 VITALS
WEIGHT: 196 LBS | BODY MASS INDEX: 25.15 KG/M2 | DIASTOLIC BLOOD PRESSURE: 71 MMHG | SYSTOLIC BLOOD PRESSURE: 119 MMHG | HEIGHT: 74 IN

## 2021-05-18 DIAGNOSIS — R91.1 PULMONARY NODULE: Primary | ICD-10-CM

## 2021-05-18 DIAGNOSIS — J44.9 CHRONIC OBSTRUCTIVE PULMONARY DISEASE, UNSPECIFIED COPD TYPE (HCC): ICD-10-CM

## 2021-05-18 PROCEDURE — 3017F COLORECTAL CA SCREEN DOC REV: CPT | Performed by: THORACIC SURGERY (CARDIOTHORACIC VASCULAR SURGERY)

## 2021-05-18 PROCEDURE — G8427 DOCREV CUR MEDS BY ELIG CLIN: HCPCS | Performed by: THORACIC SURGERY (CARDIOTHORACIC VASCULAR SURGERY)

## 2021-05-18 PROCEDURE — 99204 OFFICE O/P NEW MOD 45 MIN: CPT | Performed by: THORACIC SURGERY (CARDIOTHORACIC VASCULAR SURGERY)

## 2021-05-18 PROCEDURE — G8419 CALC BMI OUT NRM PARAM NOF/U: HCPCS | Performed by: THORACIC SURGERY (CARDIOTHORACIC VASCULAR SURGERY)

## 2021-05-18 PROCEDURE — 1036F TOBACCO NON-USER: CPT | Performed by: THORACIC SURGERY (CARDIOTHORACIC VASCULAR SURGERY)

## 2021-05-18 RX ORDER — CHLORHEXIDINE GLUCONATE 0.12 MG/ML
15 RINSE ORAL ONCE
Status: CANCELLED | OUTPATIENT
Start: 2021-05-18 | End: 2021-05-18

## 2021-05-18 RX ORDER — CHLORHEXIDINE GLUCONATE 4 G/100ML
SOLUTION TOPICAL ONCE
Status: CANCELLED | OUTPATIENT
Start: 2021-05-18 | End: 2021-05-18

## 2021-05-18 RX ORDER — SODIUM CHLORIDE 9 MG/ML
25 INJECTION, SOLUTION INTRAVENOUS PRN
Status: CANCELLED | OUTPATIENT
Start: 2021-05-18

## 2021-05-18 RX ORDER — SODIUM CHLORIDE 0.9 % (FLUSH) 0.9 %
10 SYRINGE (ML) INJECTION EVERY 12 HOURS SCHEDULED
Status: CANCELLED | OUTPATIENT
Start: 2021-05-18

## 2021-05-18 RX ORDER — SODIUM CHLORIDE 0.9 % (FLUSH) 0.9 %
10 SYRINGE (ML) INJECTION PRN
Status: CANCELLED | OUTPATIENT
Start: 2021-05-18

## 2021-05-18 RX ORDER — SODIUM CHLORIDE 9 MG/ML
INJECTION, SOLUTION INTRAVENOUS CONTINUOUS
Status: CANCELLED | OUTPATIENT
Start: 2021-05-18

## 2021-05-18 ASSESSMENT — ENCOUNTER SYMPTOMS
SHORTNESS OF BREATH: 0
COUGH: 0
WHEEZING: 0

## 2021-05-18 NOTE — PROGRESS NOTES
Subjective:      Patient ID: Jessica Perez is a 60 y.o. male who presents to office upon referral from Dr Renetta Biggs. He previously had a RUL lung nodule that was being followed and was noted to have increase in size on most recent CT scan 2/18/2021 to 7mm in size. PET CT was done last month 4/13/2021 and nodule was noted to be PET positive. Patient denies any fevers, chills, cough, CP, SOB, hemoptysis or other complaints  They are a former smoker, having recently quit ( 1ppd x 28 years)    HPI    Review of Systems   Constitutional: Negative for chills, fatigue and unexpected weight change. Respiratory: Negative for cough, shortness of breath and wheezing. Cardiovascular: Negative for chest pain and palpitations. Objective:   Physical Exam  Constitutional:       Appearance: Normal appearance. Cardiovascular:      Rate and Rhythm: Normal rate and regular rhythm. Pulses: Normal pulses. Heart sounds: Normal heart sounds. Pulmonary:      Effort: Pulmonary effort is normal.      Breath sounds: Normal breath sounds. Abdominal:      General: Bowel sounds are normal.   Musculoskeletal:         General: Normal range of motion. Cervical back: Normal range of motion and neck supple. Neurological:      Mental Status: He is alert and oriented to person, place, and time. Assessment:      RUL nodule, growing      Plan:      I agree it should come out. All risks, benefits, alternatives and potential complications explained thoroughly including, but not limited to, bleeding, infection, lung injury, kidney injury, stroke, heart attack, prolonged ventilation, wound complication, need for re-operation, and death, and the patient agrees to proceed. PAT on 5/25 with OR 5/28 at 7:30am for robotic RUL wedge possible lobe.

## 2021-05-18 NOTE — H&P
Lauren Mercado is a 60 y.o. male who recently  Presented to office upon referral from Dr Celsa Magallanes. He previously had a RUL lung nodule that was being followed and was noted to have increase in size on most recent CT scan 2021 to 7mm in size. PET CT was done last month 2021 and nodule was noted to be PET positive. Patient denies any fevers, chills, cough, CP, SOB, hemoptysis or other complaints  They are a former smoker, having recently quit ( 1ppd x 28 years)     Past Medical History:   Diagnosis Date    Anxiety     COPD with asthma ([facility] [address]     Lung nodule     Vitamin D deficiency      Past Surgical History:   Procedure Laterality Date    COLONOSCOPY      EYE SURGERY  1988    vision lazy eye    HEEL SPUR SURGERY  02/15/2011    rt heel    KNEE SURGERY      [place] NASAL/SINUS NDSC SURG W/DILATION FRONTAL SINUS N/A 2018    FUNCTIONAL  ENDOSCOPIC SINUS  SURGERY WITH BILATERAL MAXILLARY, FRONTAL, AND SPHENOID ANTROSTOMIES; SUBMUCOSOID RESECTION OF INFERIOR TURBINATES AND SEPTOPLASTY performed by Gayle Guthrie DO at [facility] OR     Social History     Tobacco Use    Smoking status: Former Smoker     Packs/day: 1.00     Years: 30.00     Pack years: 30.00     Types: Cigarettes     Quit date: 2018     Years since quittin.7    Smokeless tobacco: Never Used    Tobacco comment: Per CT Lung Screening order    Vaping Use    Vaping Use: Never used   Substance Use Topics    Alcohol use: No    Drug use: No       Current Outpatient Medications   Medication Instructions    ARTIFICIAL TEARS 1.4 % ophthalmic solution No dose, route, or frequency recorded.     Cholecalciferol (VITAMIN D3) 5000 units TABS Oral, DAILY    [employer] D3 2,000 Units, Oral, DAILY    Diphenhydramine-APAP, sleep, (TYLENOL PM EXTRA STRENGTH)  MG TABS 1 tablet, Oral, NIGHTLY PRN, otc med    DULERA 200-5 MCG/ACT inhaler 1 puff    Ginkgo Biloba 40 MG TABS Oral, STOP PREOP MED     Ginseng 250 MG CAPS 1 capsule, Oral, DAILY, otc med    Multiple Vitamins-Minerals (CENTRUM SILVER PO) Oral, STOP PREOP MED        Allergies   Allergen Reactions    Ibuprofen Other (See Comments)     Blood in stool          Review of Systems   Constitutional: Negative for chills, fatigue and unexpected weight change. Respiratory: Negative for cough, shortness of breath and wheezing. Cardiovascular: Negative for chest pain and palpitations.         Physical Exam  Constitutional:       Appearance: Normal appearance. Cardiovascular:      Rate and Rhythm: Normal rate and regular rhythm. Pulses: Normal pulses. Heart sounds: Normal heart sounds. Pulmonary:      Effort: Pulmonary effort is normal.      Breath sounds: Normal breath sounds. Abdominal:      General: Bowel sounds are normal.   Musculoskeletal:         General: Normal range of motion. Cervical back: Normal range of motion and neck supple. Neurological:      Mental Status: He is alert and oriented to person, place, and time.          Assessment:   RUL nodule, growing                Plan:   I agree it should come out. All risks, benefits, alternatives and potential complications explained thoroughly including, but not limited to, bleeding, infection, lung injury, kidney injury, stroke, heart attack, prolonged ventilation, wound complication, need for re-operation, and death, and the patient agrees to proceed. PAT on 5/25 with OR 5/28 at 7:30am for robotic RUL wedge possible lobe.

## 2021-05-19 ENCOUNTER — TELEPHONE (OUTPATIENT)
Dept: CARDIOTHORACIC SURGERY | Age: 56
End: 2021-05-19

## 2021-05-19 NOTE — TELEPHONE ENCOUNTER
I have this submitted for authorization with [facility] and they can take up to 14 days for review.   [employer]

## 2021-05-19 NOTE — TELEPHONE ENCOUNTER
Pt has had Covid vaccinations. Will bring card with him to [email or web address] 5/25 10:30 arrive @ 10:15

## 2021-05-20 NOTE — PROGRESS NOTES
please write the last time you took the medicine, give this list to the nurse. [x] Take the following medications the morning of surgery with 1-2 ounces of water:  USE DULERA. [x] Stop herbal supplements and vitamins 5 days before your surgery. WHAT TO EXPECT:  [x] The day of surgery you will be greeted and checked in by the [facility] [employer] [facility].  In addition, you will be registered in the [place] by a Patient Access Representative. Please bring your photo ID and insurance card. A nurse will greet you in accordance to the time you are needed in the pre-op area to prepare you for surgery. Please do not be discouraged if you are not greeted in the order you arrive as there are many variables that are involved in patient preparation. Your patience is greatly appreciated as you wait for your nurse. [x]  Delays may occur with surgery and staff will make a sincere effort to keep you informed of delays. If any delays occur with your procedure, we apologize ahead of time for your inconvenience as we recognize the value of your time.

## 2021-05-25 ENCOUNTER — HOSPITAL ENCOUNTER (OUTPATIENT)
Dept: PREADMISSION TESTING | Age: 56
Setting detail: SURGERY ADMIT
Discharge: HOME | DRG: 120 | End: 2021-05-25
Payer: COMMERCIAL

## 2021-05-25 VITALS
BODY MASS INDEX: 25.15 KG/M2 | SYSTOLIC BLOOD PRESSURE: 112 MMHG | RESPIRATION RATE: 16 BRPM | DIASTOLIC BLOOD PRESSURE: 71 MMHG | HEIGHT: 74 IN | TEMPERATURE: 98.1 F | OXYGEN SATURATION: 97 % | WEIGHT: 196 LBS

## 2021-05-25 DIAGNOSIS — R91.1 PULMONARY NODULE: ICD-10-CM

## 2021-05-25 DIAGNOSIS — J44.9 CHRONIC OBSTRUCTIVE PULMONARY DISEASE, UNSPECIFIED COPD TYPE (HCC): ICD-10-CM

## 2021-05-25 LAB
ABO/RH: NORMAL
ALBUMIN SERPL-MCNC: 4.2 G/DL (ref 3.5–5.2)
ALP BLD-CCNC: 65 U/L (ref 40–129)
ALT SERPL-CCNC: 26 U/L (ref 0–40)
ANION GAP SERPL CALCULATED.3IONS-SCNC: 12 MMOL/L (ref 7–16)
ANTIBODY SCREEN: NORMAL
APTT: 30.7 SEC (ref 24.5–35.1)
AST SERPL-CCNC: 21 U/L (ref 0–39)
BILIRUB SERPL-MCNC: <0.2 MG/DL (ref 0–1.2)
BUN BLDV-MCNC: 17 MG/DL (ref 6–20)
CALCIUM SERPL-MCNC: 9 MG/DL (ref 8.6–10.2)
CHLORIDE BLD-SCNC: 101 MMOL/L (ref 98–107)
CO2: 26 MMOL/L (ref 22–29)
CREAT SERPL-MCNC: 1 MG/DL (ref 0.7–1.2)
EKG ATRIAL RATE: 68 BPM
EKG P AXIS: 76 DEGREES
EKG P-R INTERVAL: 142 MS
EKG Q-T INTERVAL: 406 MS
EKG QRS DURATION: 92 MS
EKG QTC CALCULATION (BAZETT): 431 MS
EKG R AXIS: 63 DEGREES
EKG T AXIS: 65 DEGREES
EKG VENTRICULAR RATE: 68 BPM
GFR AFRICAN AMERICAN: >60
GFR NON-AFRICAN AMERICAN: >60 ML/MIN/1.73
GLUCOSE BLD-MCNC: 124 MG/DL (ref 74–99)
HCT VFR BLD CALC: 45.7 % (ref 37–54)
HEMOGLOBIN: 14.7 G/DL (ref 12.5–16.5)
INR BLD: 0.9
MCH RBC QN AUTO: 31.4 PG (ref 26–35)
MCHC RBC AUTO-ENTMCNC: 32.2 % (ref 32–34.5)
MCV RBC AUTO: 97.6 FL (ref 80–99.9)
PDW BLD-RTO: 13.8 FL (ref 11.5–15)
PLATELET # BLD: 339 E9/L (ref 130–450)
PMV BLD AUTO: 9.5 FL (ref 7–12)
POTASSIUM SERPL-SCNC: 3.8 MMOL/L (ref 3.5–5)
PROTHROMBIN TIME: 10 SEC (ref 9.3–12.4)
RBC # BLD: 4.68 E12/L (ref 3.8–5.8)
SODIUM BLD-SCNC: 139 MMOL/L (ref 132–146)
TOTAL PROTEIN: 6.4 G/DL (ref 6.4–8.3)
WBC # BLD: 7.9 E9/L (ref 4.5–11.5)

## 2021-05-25 PROCEDURE — 87081 CULTURE SCREEN ONLY: CPT

## 2021-05-25 PROCEDURE — 85730 THROMBOPLASTIN TIME PARTIAL: CPT

## 2021-05-25 PROCEDURE — 85027 COMPLETE CBC AUTOMATED: CPT

## 2021-05-25 PROCEDURE — 85610 PROTHROMBIN TIME: CPT

## 2021-05-25 PROCEDURE — 93005 ELECTROCARDIOGRAM TRACING: CPT | Performed by: THORACIC SURGERY (CARDIOTHORACIC VASCULAR SURGERY)

## 2021-05-25 PROCEDURE — 80053 COMPREHEN METABOLIC PANEL: CPT

## 2021-05-25 PROCEDURE — 36415 COLL VENOUS BLD VENIPUNCTURE: CPT

## 2021-05-25 PROCEDURE — 86850 RBC ANTIBODY SCREEN: CPT

## 2021-05-25 PROCEDURE — 86901 BLOOD TYPING SEROLOGIC RH(D): CPT

## 2021-05-25 PROCEDURE — 86900 BLOOD TYPING SEROLOGIC ABO: CPT

## 2021-05-25 PROCEDURE — 93010 ELECTROCARDIOGRAM REPORT: CPT | Performed by: INTERNAL MEDICINE

## 2021-05-25 PROCEDURE — 86923 COMPATIBILITY TEST ELECTRIC: CPT

## 2021-05-26 ENCOUNTER — HOSPITAL ENCOUNTER (OUTPATIENT)
Dept: PULMONOLOGY | Age: 56
Discharge: HOME | DRG: 120 | End: 2021-05-26
Payer: COMMERCIAL

## 2021-05-26 DIAGNOSIS — R91.1 PULMONARY NODULE: ICD-10-CM

## 2021-05-26 DIAGNOSIS — J44.9 CHRONIC OBSTRUCTIVE PULMONARY DISEASE, UNSPECIFIED COPD TYPE (HCC): ICD-10-CM

## 2021-05-26 LAB — MRSA CULTURE ONLY: NORMAL

## 2021-05-26 PROCEDURE — 94375 RESPIRATORY FLOW VOLUME LOOP: CPT

## 2021-05-26 PROCEDURE — 94729 DIFFUSING CAPACITY: CPT

## 2021-05-26 NOTE — TELEPHONE ENCOUNTER
just checked with [facility] on patient Bridgette Butcher for 5/28/2021 and this case is still pending. Received this 5/18/2021 and submitted to [facility] 5/18/2021 and they take 14 days for review.

## 2021-05-27 ENCOUNTER — ANESTHESIA EVENT (OUTPATIENT)
Dept: OPERATING ROOM | Age: 56
DRG: 120 | End: 2021-05-27
Payer: COMMERCIAL

## 2021-05-27 PROCEDURE — 94729 DIFFUSING CAPACITY: CPT | Performed by: INTERNAL MEDICINE

## 2021-05-27 PROCEDURE — 94010 BREATHING CAPACITY TEST: CPT | Performed by: INTERNAL MEDICINE

## 2021-05-27 PROCEDURE — 94726 PLETHYSMOGRAPHY LUNG VOLUMES: CPT | Performed by: INTERNAL MEDICINE

## 2021-05-27 NOTE — PROGRESS NOTES
CALLED DAVID SPANN WITH PERMIT ORDER IN [facility] FOR SURGERY 5-28

## 2021-05-27 NOTE — PROCEDURES
[address]                  [address] [facility],  [facility]                               PULMONARY FUNCTION    PATIENT NAME: Katey Bazan                    :        1965  MED REC NO:   [id number]                            ROOM:  ACCOUNT NO:   [de-identified]                           ADMIT DATE: 2021  PROVIDER:     Lee Valdez DO    DATE OF PROCEDURE:  2021    IDENTIFYING DATA:  A 68-year-old male, 74 inches, 196 pounds,  preoperative assessment in a 25 pack-year smoker. FINDINGS:  Spirometry reveals airflow obstruction with a forced vital  capacity of 5.11 liters, 94% of predicted and FEV1 of 2.89 liters, 69%  of predicted with an FEV1/FVC ratio of 57%. Midflow rates are 1.35  liters per second, which is 39% of predicted with a maximum voluntary  ventilation of 123 liters per minute, 78% of predicted. Static lung volumes with slow vital capacity of 4.74 liters, 87%  predicted. Inspiratory capacity 3.77 liters, 102% of predicted. Expiratory reserve volume 0.97 liters, 56% of predicted with a diffusion  capacity 18. 43 mL/minute per mmHg, which is only 48% of predicted. IMPRESSION:  Mild airflow obstruction, GOLD I COPD with  moderate-to-severe loss in gas transfer consistent with destruction at  the alveolar capillary bed, as seen in someone who has parenchymal lung  destruction such as emphysema. Clinical correlation is needed.         Isi Hunt DO    D: 2021 8:42:18       T: 2021 8:51:29     [email or web address]  Job#: [id number]     Doc#: [id number]    CC:    Eileen Spann PA-C (referring)

## 2021-05-28 ENCOUNTER — APPOINTMENT (OUTPATIENT)
Dept: GENERAL RADIOLOGY | Age: 56
DRG: 120 | End: 2021-05-28
Attending: THORACIC SURGERY (CARDIOTHORACIC VASCULAR SURGERY)
Payer: COMMERCIAL

## 2021-05-28 ENCOUNTER — ANESTHESIA (OUTPATIENT)
Dept: OPERATING ROOM | Age: 56
DRG: 120 | End: 2021-05-28
Payer: COMMERCIAL

## 2021-05-28 ENCOUNTER — HOSPITAL ENCOUNTER (INPATIENT)
Age: 56
LOS: 4 days | Discharge: HOME | DRG: 120 | End: 2021-06-01
Attending: THORACIC SURGERY (CARDIOTHORACIC VASCULAR SURGERY) | Admitting: THORACIC SURGERY (CARDIOTHORACIC VASCULAR SURGERY)
Payer: COMMERCIAL

## 2021-05-28 VITALS — SYSTOLIC BLOOD PRESSURE: 110 MMHG | OXYGEN SATURATION: 98 % | DIASTOLIC BLOOD PRESSURE: 78 MMHG | TEMPERATURE: 96.3 F

## 2021-05-28 DIAGNOSIS — G89.18 ACUTE POST-OPERATIVE PAIN: Primary | ICD-10-CM

## 2021-05-28 PROBLEM — R91.1 LUNG NODULE: Status: ACTIVE | Noted: 2021-05-28

## 2021-05-28 LAB
ANION GAP SERPL CALCULATED.3IONS-SCNC: 12 MMOL/L (ref 7–16)
BUN BLDV-MCNC: 22 MG/DL (ref 6–20)
CALCIUM SERPL-MCNC: 8.1 MG/DL (ref 8.6–10.2)
CHLORIDE BLD-SCNC: 103 MMOL/L (ref 98–107)
CO2: 21 MMOL/L (ref 22–29)
CREAT SERPL-MCNC: 0.7 MG/DL (ref 0.7–1.2)
GFR AFRICAN AMERICAN: >60
GFR NON-AFRICAN AMERICAN: >60 ML/MIN/1.73
GLUCOSE BLD-MCNC: 185 MG/DL (ref 74–99)
HCT VFR BLD CALC: 42.6 % (ref 37–54)
HEMOGLOBIN: 14 G/DL (ref 12.5–16.5)
MAGNESIUM: 2.4 MG/DL (ref 1.6–2.6)
MCH RBC QN AUTO: 31.6 PG (ref 26–35)
MCHC RBC AUTO-ENTMCNC: 32.9 % (ref 32–34.5)
MCV RBC AUTO: 96.2 FL (ref 80–99.9)
PDW BLD-RTO: 14 FL (ref 11.5–15)
PLATELET # BLD: 318 E9/L (ref 130–450)
PMV BLD AUTO: 9 FL (ref 7–12)
POTASSIUM SERPL-SCNC: 3.8 MMOL/L (ref 3.5–5)
RBC # BLD: 4.43 E12/L (ref 3.8–5.8)
SODIUM BLD-SCNC: 136 MMOL/L (ref 132–146)
WBC # BLD: 20.6 E9/L (ref 4.5–11.5)

## 2021-05-28 PROCEDURE — 32668 THORACOSCOPY W/W RESECT DIAG: CPT | Performed by: THORACIC SURGERY (CARDIOTHORACIC VASCULAR SURGERY)

## 2021-05-28 PROCEDURE — 2140000000 HC CCU INTERMEDIATE R&B

## 2021-05-28 PROCEDURE — 88307 TISSUE EXAM BY PATHOLOGIST: CPT

## 2021-05-28 PROCEDURE — 07B74ZX EXCISION OF THORAX LYMPHATIC, PERCUTANEOUS ENDOSCOPIC APPROACH, DIAGNOSTIC: ICD-10-PCS | Performed by: THORACIC SURGERY (CARDIOTHORACIC VASCULAR SURGERY)

## 2021-05-28 PROCEDURE — 88309 TISSUE EXAM BY PATHOLOGIST: CPT

## 2021-05-28 PROCEDURE — 2700000000 HC OXYGEN THERAPY PER DAY

## 2021-05-28 PROCEDURE — 8E0W4CZ ROBOTIC ASSISTED PROCEDURE OF TRUNK REGION, PERCUTANEOUS ENDOSCOPIC APPROACH: ICD-10-PCS | Performed by: THORACIC SURGERY (CARDIOTHORACIC VASCULAR SURGERY)

## 2021-05-28 PROCEDURE — 88341 IMHCHEM/IMCYTCHM EA ADD ANTB: CPT

## 2021-05-28 PROCEDURE — 2500000003 HC RX 250 WO HCPCS: Performed by: THORACIC SURGERY (CARDIOTHORACIC VASCULAR SURGERY)

## 2021-05-28 PROCEDURE — 2709999900 HC NON-CHARGEABLE SUPPLY: Performed by: THORACIC SURGERY (CARDIOTHORACIC VASCULAR SURGERY)

## 2021-05-28 PROCEDURE — S2900 ROBOTIC SURGICAL SYSTEM: HCPCS | Performed by: THORACIC SURGERY (CARDIOTHORACIC VASCULAR SURGERY)

## 2021-05-28 PROCEDURE — 3600000009 HC SURGERY ROBOT BASE: Performed by: THORACIC SURGERY (CARDIOTHORACIC VASCULAR SURGERY)

## 2021-05-28 PROCEDURE — 3700000000 HC ANESTHESIA ATTENDED CARE: Performed by: THORACIC SURGERY (CARDIOTHORACIC VASCULAR SURGERY)

## 2021-05-28 PROCEDURE — 94640 AIRWAY INHALATION TREATMENT: CPT

## 2021-05-28 PROCEDURE — 88342 IMHCHEM/IMCYTCHM 1ST ANTB: CPT

## 2021-05-28 PROCEDURE — 6360000002 HC RX W HCPCS: Performed by: ANESTHESIOLOGY

## 2021-05-28 PROCEDURE — 88305 TISSUE EXAM BY PATHOLOGIST: CPT

## 2021-05-28 PROCEDURE — 6370000000 HC RX 637 (ALT 250 FOR IP): Performed by: NURSE PRACTITIONER

## 2021-05-28 PROCEDURE — 2580000003 HC RX 258: Performed by: NURSE PRACTITIONER

## 2021-05-28 PROCEDURE — 71045 X-RAY EXAM CHEST 1 VIEW: CPT

## 2021-05-28 PROCEDURE — 32663 THORACOSCOPY W/LOBECTOMY: CPT | Performed by: THORACIC SURGERY (CARDIOTHORACIC VASCULAR SURGERY)

## 2021-05-28 PROCEDURE — 2500000003 HC RX 250 WO HCPCS

## 2021-05-28 PROCEDURE — 7100000000 HC PACU RECOVERY - FIRST 15 MIN: Performed by: THORACIC SURGERY (CARDIOTHORACIC VASCULAR SURGERY)

## 2021-05-28 PROCEDURE — 76942 ECHO GUIDE FOR BIOPSY: CPT | Performed by: ANESTHESIOLOGY

## 2021-05-28 PROCEDURE — 2580000003 HC RX 258: Performed by: THORACIC SURGERY (CARDIOTHORACIC VASCULAR SURGERY)

## 2021-05-28 PROCEDURE — 3E0T3BZ INTRODUCTION OF ANESTHETIC AGENT INTO PERIPHERAL NERVES AND PLEXI, PERCUTANEOUS APPROACH: ICD-10-PCS | Performed by: THORACIC SURGERY (CARDIOTHORACIC VASCULAR SURGERY)

## 2021-05-28 PROCEDURE — 3700000001 HC ADD 15 MINUTES (ANESTHESIA): Performed by: THORACIC SURGERY (CARDIOTHORACIC VASCULAR SURGERY)

## 2021-05-28 PROCEDURE — 88331 PATH CONSLTJ SURG 1 BLK 1SPC: CPT

## 2021-05-28 PROCEDURE — 99999 PR OFFICE/OUTPT VISIT,PROCEDURE ONLY: CPT | Performed by: PHYSICIAN ASSISTANT

## 2021-05-28 PROCEDURE — 2580000003 HC RX 258

## 2021-05-28 PROCEDURE — 83735 ASSAY OF MAGNESIUM: CPT

## 2021-05-28 PROCEDURE — 80048 BASIC METABOLIC PNL TOTAL CA: CPT

## 2021-05-28 PROCEDURE — 3600000019 HC SURGERY ROBOT ADDTL 15MIN: Performed by: THORACIC SURGERY (CARDIOTHORACIC VASCULAR SURGERY)

## 2021-05-28 PROCEDURE — 6370000000 HC RX 637 (ALT 250 FOR IP): Performed by: THORACIC SURGERY (CARDIOTHORACIC VASCULAR SURGERY)

## 2021-05-28 PROCEDURE — 32674 THORACOSCOPY LYMPH NODE EXC: CPT | Performed by: THORACIC SURGERY (CARDIOTHORACIC VASCULAR SURGERY)

## 2021-05-28 PROCEDURE — 36415 COLL VENOUS BLD VENIPUNCTURE: CPT

## 2021-05-28 PROCEDURE — 2500000003 HC RX 250 WO HCPCS: Performed by: NURSE PRACTITIONER

## 2021-05-28 PROCEDURE — 6360000002 HC RX W HCPCS: Performed by: NURSE PRACTITIONER

## 2021-05-28 PROCEDURE — 6360000002 HC RX W HCPCS: Performed by: PHYSICIAN ASSISTANT

## 2021-05-28 PROCEDURE — 6360000002 HC RX W HCPCS: Performed by: THORACIC SURGERY (CARDIOTHORACIC VASCULAR SURGERY)

## 2021-05-28 PROCEDURE — 6360000002 HC RX W HCPCS

## 2021-05-28 PROCEDURE — 2720000010 HC SURG SUPPLY STERILE: Performed by: THORACIC SURGERY (CARDIOTHORACIC VASCULAR SURGERY)

## 2021-05-28 PROCEDURE — 85027 COMPLETE CBC AUTOMATED: CPT

## 2021-05-28 PROCEDURE — 0BTC4ZZ RESECTION OF RIGHT UPPER LUNG LOBE, PERCUTANEOUS ENDOSCOPIC APPROACH: ICD-10-PCS | Performed by: THORACIC SURGERY (CARDIOTHORACIC VASCULAR SURGERY)

## 2021-05-28 PROCEDURE — 94664 DEMO&/EVAL PT USE INHALER: CPT

## 2021-05-28 PROCEDURE — 6370000000 HC RX 637 (ALT 250 FOR IP): Performed by: ANESTHESIOLOGY

## 2021-05-28 PROCEDURE — C1713 ANCHOR/SCREW BN/BN,TIS/BN: HCPCS | Performed by: THORACIC SURGERY (CARDIOTHORACIC VASCULAR SURGERY)

## 2021-05-28 PROCEDURE — 7100000001 HC PACU RECOVERY - ADDTL 15 MIN: Performed by: THORACIC SURGERY (CARDIOTHORACIC VASCULAR SURGERY)

## 2021-05-28 RX ORDER — ONDANSETRON 2 MG/ML
4 INJECTION INTRAMUSCULAR; INTRAVENOUS EVERY 6 HOURS PRN
Status: DISCONTINUED | OUTPATIENT
Start: 2021-05-28 | End: 2021-06-01 | Stop reason: HOSPADM

## 2021-05-28 RX ORDER — OXYCODONE HYDROCHLORIDE AND ACETAMINOPHEN 5; 325 MG/1; MG/1
1 TABLET ORAL PRN
Status: DISCONTINUED | OUTPATIENT
Start: 2021-05-28 | End: 2021-05-28 | Stop reason: HOSPADM

## 2021-05-28 RX ORDER — CHLORHEXIDINE GLUCONATE 0.12 MG/ML
15 RINSE ORAL ONCE
Status: COMPLETED | OUTPATIENT
Start: 2021-05-28 | End: 2021-05-28

## 2021-05-28 RX ORDER — CHLORHEXIDINE GLUCONATE 4 G/100ML
SOLUTION TOPICAL ONCE
Status: DISCONTINUED | OUTPATIENT
Start: 2021-05-28 | End: 2021-05-28 | Stop reason: HOSPADM

## 2021-05-28 RX ORDER — ROPIVACAINE HYDROCHLORIDE 5 MG/ML
INJECTION, SOLUTION EPIDURAL; INFILTRATION; PERINEURAL
Status: COMPLETED | OUTPATIENT
Start: 2021-05-28 | End: 2021-05-28

## 2021-05-28 RX ORDER — FENTANYL CITRATE 50 UG/ML
INJECTION, SOLUTION INTRAMUSCULAR; INTRAVENOUS PRN
Status: DISCONTINUED | OUTPATIENT
Start: 2021-05-28 | End: 2021-05-28 | Stop reason: SDUPTHER

## 2021-05-28 RX ORDER — ACETAMINOPHEN 500 MG
1000 TABLET ORAL ONCE
Status: COMPLETED | OUTPATIENT
Start: 2021-05-28 | End: 2021-05-28

## 2021-05-28 RX ORDER — AMIODARONE HYDROCHLORIDE 50 MG/ML
INJECTION, SOLUTION INTRAVENOUS PRN
Status: DISCONTINUED | OUTPATIENT
Start: 2021-05-28 | End: 2021-05-28 | Stop reason: SDUPTHER

## 2021-05-28 RX ORDER — KETOROLAC TROMETHAMINE 30 MG/ML
INJECTION, SOLUTION INTRAMUSCULAR; INTRAVENOUS
Status: COMPLETED
Start: 2021-05-28 | End: 2021-05-28

## 2021-05-28 RX ORDER — BISACODYL 10 MG
10 SUPPOSITORY, RECTAL RECTAL DAILY PRN
Status: DISCONTINUED | OUTPATIENT
Start: 2021-05-28 | End: 2021-06-01 | Stop reason: HOSPADM

## 2021-05-28 RX ORDER — MORPHINE SULFATE 2 MG/ML
2 INJECTION, SOLUTION INTRAMUSCULAR; INTRAVENOUS EVERY 5 MIN PRN
Status: COMPLETED | OUTPATIENT
Start: 2021-05-28 | End: 2021-05-28

## 2021-05-28 RX ORDER — KETOROLAC TROMETHAMINE 30 MG/ML
15 INJECTION, SOLUTION INTRAMUSCULAR; INTRAVENOUS EVERY 6 HOURS
Status: COMPLETED | OUTPATIENT
Start: 2021-05-28 | End: 2021-05-30

## 2021-05-28 RX ORDER — SODIUM CHLORIDE 9 MG/ML
25 INJECTION, SOLUTION INTRAVENOUS PRN
Status: DISCONTINUED | OUTPATIENT
Start: 2021-05-28 | End: 2021-05-28 | Stop reason: HOSPADM

## 2021-05-28 RX ORDER — MIDAZOLAM HYDROCHLORIDE 2 MG/2ML
1 INJECTION, SOLUTION INTRAMUSCULAR; INTRAVENOUS PRN
Status: DISCONTINUED | OUTPATIENT
Start: 2021-05-28 | End: 2021-05-28 | Stop reason: HOSPADM

## 2021-05-28 RX ORDER — OXYCODONE HCL 10 MG/1
10 TABLET, FILM COATED, EXTENDED RELEASE ORAL ONCE
Status: COMPLETED | OUTPATIENT
Start: 2021-05-28 | End: 2021-05-28

## 2021-05-28 RX ORDER — AMIODARONE HYDROCHLORIDE 200 MG/1
200 TABLET ORAL 2 TIMES DAILY
Status: DISCONTINUED | OUTPATIENT
Start: 2021-05-28 | End: 2021-06-01 | Stop reason: HOSPADM

## 2021-05-28 RX ORDER — SODIUM CHLORIDE 9 MG/ML
INJECTION, SOLUTION INTRAVENOUS CONTINUOUS
Status: DISCONTINUED | OUTPATIENT
Start: 2021-05-28 | End: 2021-05-28

## 2021-05-28 RX ORDER — SODIUM CHLORIDE 9 MG/ML
25 INJECTION, SOLUTION INTRAVENOUS PRN
Status: DISCONTINUED | OUTPATIENT
Start: 2021-05-28 | End: 2021-06-01 | Stop reason: HOSPADM

## 2021-05-28 RX ORDER — MORPHINE SULFATE 2 MG/ML
1 INJECTION, SOLUTION INTRAMUSCULAR; INTRAVENOUS EVERY 5 MIN PRN
Status: DISCONTINUED | OUTPATIENT
Start: 2021-05-28 | End: 2021-05-28 | Stop reason: HOSPADM

## 2021-05-28 RX ORDER — ONDANSETRON 2 MG/ML
4 INJECTION INTRAMUSCULAR; INTRAVENOUS
Status: COMPLETED | OUTPATIENT
Start: 2021-05-28 | End: 2021-05-28

## 2021-05-28 RX ORDER — ROCURONIUM BROMIDE 10 MG/ML
INJECTION, SOLUTION INTRAVENOUS PRN
Status: DISCONTINUED | OUTPATIENT
Start: 2021-05-28 | End: 2021-05-28 | Stop reason: SDUPTHER

## 2021-05-28 RX ORDER — MAGNESIUM SULFATE HEPTAHYDRATE 500 MG/ML
INJECTION, SOLUTION INTRAMUSCULAR; INTRAVENOUS PRN
Status: DISCONTINUED | OUTPATIENT
Start: 2021-05-28 | End: 2021-05-28 | Stop reason: SDUPTHER

## 2021-05-28 RX ORDER — IPRATROPIUM BROMIDE AND ALBUTEROL SULFATE 2.5; .5 MG/3ML; MG/3ML
1 SOLUTION RESPIRATORY (INHALATION)
Status: DISCONTINUED | OUTPATIENT
Start: 2021-05-28 | End: 2021-06-01 | Stop reason: HOSPADM

## 2021-05-28 RX ORDER — MIDAZOLAM HYDROCHLORIDE 1 MG/ML
INJECTION INTRAMUSCULAR; INTRAVENOUS PRN
Status: DISCONTINUED | OUTPATIENT
Start: 2021-05-28 | End: 2021-05-28 | Stop reason: SDUPTHER

## 2021-05-28 RX ORDER — SODIUM CHLORIDE 9 MG/ML
INJECTION, SOLUTION INTRAVENOUS CONTINUOUS PRN
Status: DISCONTINUED | OUTPATIENT
Start: 2021-05-28 | End: 2021-05-28 | Stop reason: SDUPTHER

## 2021-05-28 RX ORDER — LIDOCAINE HYDROCHLORIDE 20 MG/ML
INJECTION, SOLUTION INTRAVENOUS PRN
Status: DISCONTINUED | OUTPATIENT
Start: 2021-05-28 | End: 2021-05-28 | Stop reason: SDUPTHER

## 2021-05-28 RX ORDER — SODIUM CHLORIDE 0.9 % (FLUSH) 0.9 %
10 SYRINGE (ML) INJECTION EVERY 12 HOURS SCHEDULED
Status: DISCONTINUED | OUTPATIENT
Start: 2021-05-28 | End: 2021-05-28 | Stop reason: HOSPADM

## 2021-05-28 RX ORDER — MORPHINE SULFATE 2 MG/ML
INJECTION, SOLUTION INTRAMUSCULAR; INTRAVENOUS
Status: COMPLETED
Start: 2021-05-28 | End: 2021-05-28

## 2021-05-28 RX ORDER — OXYCODONE HYDROCHLORIDE AND ACETAMINOPHEN 5; 325 MG/1; MG/1
2 TABLET ORAL PRN
Status: DISCONTINUED | OUTPATIENT
Start: 2021-05-28 | End: 2021-05-28 | Stop reason: HOSPADM

## 2021-05-28 RX ORDER — ACETAMINOPHEN 325 MG/1
650 TABLET ORAL EVERY 6 HOURS
Status: DISPENSED | OUTPATIENT
Start: 2021-05-28 | End: 2021-05-30

## 2021-05-28 RX ORDER — SODIUM CHLORIDE 0.9 % (FLUSH) 0.9 %
10 SYRINGE (ML) INJECTION EVERY 12 HOURS SCHEDULED
Status: DISCONTINUED | OUTPATIENT
Start: 2021-05-28 | End: 2021-06-01 | Stop reason: HOSPADM

## 2021-05-28 RX ORDER — BUPIVACAINE HYDROCHLORIDE AND EPINEPHRINE 5; 5 MG/ML; UG/ML
INJECTION, SOLUTION EPIDURAL; INTRACAUDAL; PERINEURAL PRN
Status: DISCONTINUED | OUTPATIENT
Start: 2021-05-28 | End: 2021-05-28 | Stop reason: ALTCHOICE

## 2021-05-28 RX ORDER — MAGNESIUM SULFATE 1 G/100ML
1000 INJECTION INTRAVENOUS PRN
Status: DISCONTINUED | OUTPATIENT
Start: 2021-05-28 | End: 2021-06-01 | Stop reason: HOSPADM

## 2021-05-28 RX ORDER — PROPOFOL 10 MG/ML
INJECTION, EMULSION INTRAVENOUS PRN
Status: DISCONTINUED | OUTPATIENT
Start: 2021-05-28 | End: 2021-05-28 | Stop reason: SDUPTHER

## 2021-05-28 RX ORDER — SENNA AND DOCUSATE SODIUM 50; 8.6 MG/1; MG/1
1 TABLET, FILM COATED ORAL 2 TIMES DAILY
Status: DISCONTINUED | OUTPATIENT
Start: 2021-05-28 | End: 2021-06-01 | Stop reason: HOSPADM

## 2021-05-28 RX ORDER — MEPERIDINE HYDROCHLORIDE 25 MG/ML
12.5 INJECTION INTRAMUSCULAR; INTRAVENOUS; SUBCUTANEOUS
Status: DISCONTINUED | OUTPATIENT
Start: 2021-05-28 | End: 2021-05-28 | Stop reason: HOSPADM

## 2021-05-28 RX ORDER — CELECOXIB 100 MG/1
200 CAPSULE ORAL ONCE
Status: COMPLETED | OUTPATIENT
Start: 2021-05-28 | End: 2021-05-28

## 2021-05-28 RX ORDER — ROPIVACAINE HYDROCHLORIDE 5 MG/ML
30 INJECTION, SOLUTION EPIDURAL; INFILTRATION; PERINEURAL ONCE
Status: COMPLETED | OUTPATIENT
Start: 2021-05-28 | End: 2021-05-28

## 2021-05-28 RX ORDER — SODIUM CHLORIDE 0.9 % (FLUSH) 0.9 %
10 SYRINGE (ML) INJECTION PRN
Status: DISCONTINUED | OUTPATIENT
Start: 2021-05-28 | End: 2021-06-01 | Stop reason: HOSPADM

## 2021-05-28 RX ORDER — SODIUM CHLORIDE 0.9 % (FLUSH) 0.9 %
10 SYRINGE (ML) INJECTION PRN
Status: DISCONTINUED | OUTPATIENT
Start: 2021-05-28 | End: 2021-05-28 | Stop reason: HOSPADM

## 2021-05-28 RX ORDER — OXYCODONE HYDROCHLORIDE 5 MG/1
5 TABLET ORAL EVERY 4 HOURS PRN
Status: DISCONTINUED | OUTPATIENT
Start: 2021-05-28 | End: 2021-05-29

## 2021-05-28 RX ADMIN — ROPIVACAINE HYDROCHLORIDE 10 ML: 2 INJECTION, SOLUTION EPIDURAL; INFILTRATION at 08:48

## 2021-05-28 RX ADMIN — Medication 2000 MG: at 07:19

## 2021-05-28 RX ADMIN — AMIODARONE HYDROCHLORIDE 150 MG: 50 INJECTION, SOLUTION INTRAVENOUS at 07:58

## 2021-05-28 RX ADMIN — LIDOCAINE HYDROCHLORIDE 100 MG: 20 INJECTION, SOLUTION INTRAVENOUS at 07:15

## 2021-05-28 RX ADMIN — PROPOFOL 50 MG: 10 INJECTION, EMULSION INTRAVENOUS at 09:04

## 2021-05-28 RX ADMIN — ENOXAPARIN SODIUM 40 MG: 40 INJECTION SUBCUTANEOUS at 14:47

## 2021-05-28 RX ADMIN — ACETAMINOPHEN 650 MG: 325 TABLET ORAL at 14:46

## 2021-05-28 RX ADMIN — HYDROMORPHONE HYDROCHLORIDE 0.5 MG: 1 INJECTION, SOLUTION INTRAMUSCULAR; INTRAVENOUS; SUBCUTANEOUS at 12:26

## 2021-05-28 RX ADMIN — PHENYLEPHRINE HYDROCHLORIDE 100 MCG: 10 INJECTION INTRAVENOUS at 08:03

## 2021-05-28 RX ADMIN — OXYCODONE HYDROCHLORIDE 10 MG: 10 TABLET, FILM COATED, EXTENDED RELEASE ORAL at 06:47

## 2021-05-28 RX ADMIN — ROCURONIUM BROMIDE 50 MG: 10 INJECTION, SOLUTION INTRAVENOUS at 07:15

## 2021-05-28 RX ADMIN — ACETAMINOPHEN 650 MG: 325 TABLET ORAL at 20:51

## 2021-05-28 RX ADMIN — MORPHINE SULFATE 2 MG: 2 INJECTION, SOLUTION INTRAMUSCULAR; INTRAVENOUS at 09:36

## 2021-05-28 RX ADMIN — SODIUM CHLORIDE, PRESERVATIVE FREE 10 ML: 5 INJECTION INTRAVENOUS at 20:51

## 2021-05-28 RX ADMIN — OXYCODONE 5 MG: 5 TABLET ORAL at 15:52

## 2021-05-28 RX ADMIN — KETOROLAC TROMETHAMINE 15 MG: 30 INJECTION, SOLUTION INTRAMUSCULAR; INTRAVENOUS at 14:47

## 2021-05-28 RX ADMIN — MIDAZOLAM 2 MG: 1 INJECTION INTRAMUSCULAR; INTRAVENOUS at 06:40

## 2021-05-28 RX ADMIN — FENTANYL CITRATE 100 MCG: 50 INJECTION, SOLUTION INTRAMUSCULAR; INTRAVENOUS at 07:15

## 2021-05-28 RX ADMIN — MORPHINE SULFATE 2 MG: 2 INJECTION, SOLUTION INTRAMUSCULAR; INTRAVENOUS at 09:25

## 2021-05-28 RX ADMIN — KETOROLAC TROMETHAMINE 15 MG: 30 INJECTION, SOLUTION INTRAMUSCULAR at 09:38

## 2021-05-28 RX ADMIN — MIDAZOLAM 2 MG: 1 INJECTION INTRAMUSCULAR; INTRAVENOUS at 07:15

## 2021-05-28 RX ADMIN — IPRATROPIUM BROMIDE AND ALBUTEROL SULFATE 1 AMPULE: .5; 3 SOLUTION RESPIRATORY (INHALATION) at 19:52

## 2021-05-28 RX ADMIN — IPRATROPIUM BROMIDE AND ALBUTEROL SULFATE 1 AMPULE: .5; 3 SOLUTION RESPIRATORY (INHALATION) at 16:37

## 2021-05-28 RX ADMIN — ONDANSETRON 4 MG: 2 INJECTION INTRAMUSCULAR; INTRAVENOUS at 12:44

## 2021-05-28 RX ADMIN — ONDANSETRON 4 MG: 2 INJECTION INTRAMUSCULAR; INTRAVENOUS at 22:47

## 2021-05-28 RX ADMIN — CELECOXIB 200 MG: 100 CAPSULE ORAL at 06:47

## 2021-05-28 RX ADMIN — ROCURONIUM BROMIDE 10 MG: 10 INJECTION, SOLUTION INTRAVENOUS at 08:03

## 2021-05-28 RX ADMIN — OXYCODONE 5 MG: 5 TABLET ORAL at 20:51

## 2021-05-28 RX ADMIN — SODIUM CHLORIDE: 9 INJECTION, SOLUTION INTRAVENOUS at 07:13

## 2021-05-28 RX ADMIN — PROPOFOL 200 MG: 10 INJECTION, EMULSION INTRAVENOUS at 07:15

## 2021-05-28 RX ADMIN — ROPIVACAINE HYDROCHLORIDE 30 ML: 5 INJECTION EPIDURAL; INFILTRATION; PERINEURAL at 08:01

## 2021-05-28 RX ADMIN — ROPIVACAINE HYDROCHLORIDE 30 ML: 5 INJECTION, SOLUTION EPIDURAL; INFILTRATION; PERINEURAL at 06:45

## 2021-05-28 RX ADMIN — SODIUM CHLORIDE: 9 INJECTION, SOLUTION INTRAVENOUS at 06:05

## 2021-05-28 RX ADMIN — ACETAMINOPHEN 1000 MG: 500 TABLET ORAL at 06:47

## 2021-05-28 RX ADMIN — KETOROLAC TROMETHAMINE 15 MG: 30 INJECTION, SOLUTION INTRAMUSCULAR; INTRAVENOUS at 20:51

## 2021-05-28 RX ADMIN — 0.12% CHLORHEXIDINE GLUCONATE 15 ML: 1.2 RINSE ORAL at 06:05

## 2021-05-28 RX ADMIN — CEFAZOLIN 2000 MG: 10 INJECTION, POWDER, FOR SOLUTION INTRAVENOUS at 15:52

## 2021-05-28 RX ADMIN — SENNOSIDES AND DOCUSATE SODIUM 1 TABLET: 8.6; 5 TABLET ORAL at 20:50

## 2021-05-28 RX ADMIN — AMIODARONE HYDROCHLORIDE 200 MG: 200 TABLET ORAL at 20:51

## 2021-05-28 RX ADMIN — FENTANYL CITRATE 50 MCG: 50 INJECTION, SOLUTION INTRAMUSCULAR; INTRAVENOUS at 09:00

## 2021-05-28 RX ADMIN — MORPHINE SULFATE 2 MG: 2 INJECTION, SOLUTION INTRAMUSCULAR; INTRAVENOUS at 09:48

## 2021-05-28 RX ADMIN — FENTANYL CITRATE 50 MCG: 50 INJECTION, SOLUTION INTRAMUSCULAR; INTRAVENOUS at 09:08

## 2021-05-28 RX ADMIN — HYDROMORPHONE HYDROCHLORIDE 0.5 MG: 1 INJECTION, SOLUTION INTRAMUSCULAR; INTRAVENOUS; SUBCUTANEOUS at 09:58

## 2021-05-28 RX ADMIN — MAGNESIUM SULFATE HEPTAHYDRATE 2 G: 500 INJECTION, SOLUTION INTRAMUSCULAR; INTRAVENOUS at 07:38

## 2021-05-28 RX ADMIN — PHENYLEPHRINE HYDROCHLORIDE 100 MCG: 10 INJECTION INTRAVENOUS at 07:55

## 2021-05-28 RX ADMIN — SUGAMMADEX 180 MG: 100 INJECTION, SOLUTION INTRAVENOUS at 08:58

## 2021-05-28 RX ADMIN — KETOROLAC TROMETHAMINE 15 MG: 30 INJECTION, SOLUTION INTRAMUSCULAR; INTRAVENOUS at 09:38

## 2021-05-28 RX ADMIN — HYDROMORPHONE HYDROCHLORIDE 0.5 MG: 1 INJECTION, SOLUTION INTRAMUSCULAR; INTRAVENOUS; SUBCUTANEOUS at 11:07

## 2021-05-28 RX ADMIN — MUPIROCIN: 20 OINTMENT TOPICAL at 20:52

## 2021-05-28 RX ADMIN — MORPHINE SULFATE 2 MG: 2 INJECTION, SOLUTION INTRAMUSCULAR; INTRAVENOUS at 09:30

## 2021-05-28 RX ADMIN — CEFAZOLIN 2000 MG: 10 INJECTION, POWDER, FOR SOLUTION INTRAVENOUS at 23:21

## 2021-05-28 ASSESSMENT — PULMONARY FUNCTION TESTS
PIF_VALUE: 7
PIF_VALUE: 30
PIF_VALUE: 30
PIF_VALUE: 1
PIF_VALUE: 30
PIF_VALUE: 22
PIF_VALUE: 30
PIF_VALUE: 28
PIF_VALUE: 15
PIF_VALUE: 30
PIF_VALUE: 28
PIF_VALUE: 3
PIF_VALUE: 30
PIF_VALUE: 1
PIF_VALUE: 30
PIF_VALUE: 28
PIF_VALUE: 28
PIF_VALUE: 14
PIF_VALUE: 0
PIF_VALUE: 30
PIF_VALUE: 30
PIF_VALUE: 3
PIF_VALUE: 13
PIF_VALUE: 9
PIF_VALUE: 1
PIF_VALUE: 30
PIF_VALUE: 30
PIF_VALUE: 5
PIF_VALUE: 15
PIF_VALUE: 30
PIF_VALUE: 3
PIF_VALUE: 30
PIF_VALUE: 22
PIF_VALUE: 4
PIF_VALUE: 0
PIF_VALUE: 0
PIF_VALUE: 30
PIF_VALUE: 28
PIF_VALUE: 21
PIF_VALUE: 22
PIF_VALUE: 30
PIF_VALUE: 22
PIF_VALUE: 30
PIF_VALUE: 2
PIF_VALUE: 22
PIF_VALUE: 20
PIF_VALUE: 20
PIF_VALUE: 4
PIF_VALUE: 30
PIF_VALUE: 2
PIF_VALUE: 0
PIF_VALUE: 22
PIF_VALUE: 30
PIF_VALUE: 15
PIF_VALUE: 30
PIF_VALUE: 23
PIF_VALUE: 30
PIF_VALUE: 12
PIF_VALUE: 30
PIF_VALUE: 22
PIF_VALUE: 29
PIF_VALUE: 30
PIF_VALUE: 22
PIF_VALUE: 30
PIF_VALUE: 2
PIF_VALUE: 28
PIF_VALUE: 1

## 2021-05-28 ASSESSMENT — PAIN DESCRIPTION - PAIN TYPE
TYPE: SURGICAL PAIN

## 2021-05-28 ASSESSMENT — PAIN DESCRIPTION - LOCATION
LOCATION: CHEST

## 2021-05-28 ASSESSMENT — PAIN SCALES - GENERAL
PAINLEVEL_OUTOF10: 10
PAINLEVEL_OUTOF10: 4
PAINLEVEL_OUTOF10: 10
PAINLEVEL_OUTOF10: 9
PAINLEVEL_OUTOF10: 7
PAINLEVEL_OUTOF10: 8
PAINLEVEL_OUTOF10: 10
PAINLEVEL_OUTOF10: 3
PAINLEVEL_OUTOF10: 8
PAINLEVEL_OUTOF10: 9
PAINLEVEL_OUTOF10: 7
PAINLEVEL_OUTOF10: 10
PAINLEVEL_OUTOF10: 9
PAINLEVEL_OUTOF10: 7
PAINLEVEL_OUTOF10: 9
PAINLEVEL_OUTOF10: 8
PAINLEVEL_OUTOF10: 10

## 2021-05-28 ASSESSMENT — PAIN DESCRIPTION - DESCRIPTORS
DESCRIPTORS: ACHING;BURNING;CONSTANT
DESCRIPTORS: ACHING;BURNING;DISCOMFORT
DESCRIPTORS: ACHING;BURNING;CONSTANT
DESCRIPTORS: ACHING;BURNING;DISCOMFORT
DESCRIPTORS: ACHING;BURNING;CONSTANT

## 2021-05-28 ASSESSMENT — PAIN DESCRIPTION - ORIENTATION
ORIENTATION: RIGHT

## 2021-05-28 ASSESSMENT — PAIN DESCRIPTION - ONSET
ONSET: ON-GOING
ONSET: AWAKENED FROM SLEEP

## 2021-05-28 ASSESSMENT — PAIN DESCRIPTION - FREQUENCY
FREQUENCY: CONTINUOUS

## 2021-05-28 ASSESSMENT — PAIN - FUNCTIONAL ASSESSMENT
PAIN_FUNCTIONAL_ASSESSMENT: ACTIVITIES ARE NOT PREVENTED
PAIN_FUNCTIONAL_ASSESSMENT: 0-10

## 2021-05-28 ASSESSMENT — PAIN DESCRIPTION - PROGRESSION: CLINICAL_PROGRESSION: NOT CHANGED

## 2021-05-28 NOTE — ANESTHESIA PRE PROCEDURE
Department of Anesthesiology  Preprocedure Note       Name:  Anahy Powell   Age:  60 y.o.  :  1965                                          MRN:  [id number]         Date:  2021      Surgeon: Rena Taylor):  Charles Daley MD    Procedure: Procedure(s):  BRONCH, ROBOTIC VIDEO ASSISTED RIGHT THORACOSCOPY, RIGHT UPPER LOBE WEDGE RESECTION, POSSIBLE LOBECTOMY    Medications prior to admission:   Prior to Admission medications    Medication Sig Start Date End Date Taking? Authorizing Provider   DULERA 200-5 MCG/ACT inhaler Inhale 1 puff into the lungs daily  21  Yes Historical Provider, MD   CVS D3 50 MCG (2000) CAPS Take 2,000 Units by mouth daily 21   Historical Provider, MD   Multiple Vitamins-Minerals (CENTRUM SILVER PO) Take by mouth daily     Historical Provider, MD   Ginkgo Biloba 40 MG TABS Take by mouth daily     Historical Provider, MD   ARTIFICIAL TEARS 1.4 % ophthalmic solution  18   Historical Provider, MD   Diphenhydramine-APAP, sleep, (TYLENOL PM EXTRA STRENGTH)  MG TABS Take 1 tablet by mouth nightly as needed. otc med     Historical Provider, MD   Ginseng 250 MG CAPS Take 1 capsule by mouth daily.  otc med     Historical Provider, MD       Current medications:    Current Facility-Administered Medications   Medication Dose Route Frequency Provider Last Rate Last Admin    0.9 % sodium chloride infusion   Intravenous Continuous Charles Daley  mL/hr at 21 0605 New Bag at 21 0605    0.9 % sodium chloride infusion  25 mL Intravenous PRN Charles Daley MD        ceFAZolin (ANCEF) 2000 mg in sterile water 20 mL IV syringe  2,000 mg Intravenous On Call to Carl Arambula MD        chlorhexidine (HIBICLENS) 4 % liquid   Topical Once Charles Daley MD        sodium chloride flush 0.9 % injection 10 mL  10 mL Intravenous 2 times per day Charles Daley MD        sodium chloride flush 0.9 % injection 10 mL  10 mL Intravenous PRN Charles Daley MD           Allergies: Time of last solid consumption: 1900                        Date of last liquid consumption: 05/27/21                        Date of last solid food consumption: 05/27/21    BMI:   Wt Readings from Last 3 Encounters:   05/28/21 196 lb (88.9 kg)   05/25/21 196 lb (88.9 kg)   05/18/21 196 lb (88.9 kg)     Body mass index is 25.16 kg/m². CBC:   Lab Results   Component Value Date    WBC 7.9 05/25/2021    RBC 4.68 05/25/2021    HGB 14.7 05/25/2021    HCT 45.7 05/25/2021    MCV 97.6 05/25/2021    RDW 13.8 05/25/2021     05/25/2021       CMP:   Lab Results   Component Value Date     05/25/2021    K 3.8 05/25/2021     05/25/2021    CO2 26 05/25/2021    BUN 17 05/25/2021    CREATININE 1.0 05/25/2021    GFRAA >60 05/25/2021    LABGLOM >60 05/25/2021    GLUCOSE 124 05/25/2021    GLUCOSE 91 02/07/2012    PROT 6.4 05/25/2021    CALCIUM 9.0 05/25/2021    BILITOT <0.2 05/25/2021    ALKPHOS 65 05/25/2021    AST 21 05/25/2021    ALT 26 05/25/2021       POC Tests: No results for input(s): POCGLU, POCNA, POCK, POCCL, POCBUN, POCHEMO, POCHCT in the last 72 hours.     Coags:   Lab Results   Component Value Date    PROTIME 10.0 05/25/2021    PROTIME 10.1 10/26/2010    INR 0.9 05/25/2021    APTT 30.7 05/25/2021       HCG (If Applicable): No results found for: PREGTESTUR, PREGSERUM, HCG, HCGQUANT     ABGs: No results found for: PHART, PO2ART, HMR7KBB, VAK1HAC, BEART, J8VFULFZ     Type & Screen (If Applicable):  No results found for: LABABO, LABRH    Drug/Infectious Status (If Applicable):  No results found for: HIV, HEPCAB    COVID-19 Screening (If Applicable): No results found for: COVID19        Anesthesia Evaluation  Patient summary reviewed and Nursing notes reviewed no history of anesthetic complications:   Airway: Mallampati: I  TM distance: >3 FB   Neck ROM: full  Mouth opening: > = 3 FB Dental:          Pulmonary: breath sounds clear to auscultation  (+) COPD:  asthma (uses inhaler twice a day): Cardiovascular:Negative CV ROS            Rhythm: regular  Rate: normal                    Neuro/Psych:   Negative Neuro/Psych ROS              GI/Hepatic/Renal: Neg GI/Hepatic/Renal ROS            Endo/Other: Negative Endo/Other ROS                    Abdominal:           Vascular: negative vascular ROS. Anesthesia Plan      general     ASA 3       Induction: intravenous. arterial line  MIPS: One-lung ventilation. Anesthetic plan and risks discussed with patient. Use of blood products discussed with patient whom consented to blood products. Plan discussed with CRNA.                   [facility]   5/28/2021

## 2021-05-28 NOTE — PROGRESS NOTES
INTRAOPERATIVE CONSULTATION (with FROZEN SECTION)    M. Lung, RUL, lobectomy: Bronchial margin negative for carcinoma.

## 2021-05-28 NOTE — BRIEF OP NOTE
Brief Postoperative Note    Lauren Mercado  YOB: 1965  [id number]    Pre-operative Diagnosis: Lung nodule    Post-operative Diagnosis: Same, lung cancer    Operation: Right robotic VATS/Robotic RUL wedge resection/Robotic RULobectomy/Mediastinal lymph node dissection/Intercostal nerve block    Anesthesia: General    Surgeon: Nicky Dacosta    Assistants: Randall    Estimated Blood Loss: less than 50     Complications: None    Specimens:   ID Type Source Tests Collected by Time Destination   A : RIGHT UPPER LOBE WEDGE Tissue Lung SURGICAL PATHOLOGY Ann Camacho MD 5/28/2021 0739    B : INFERIOR HILAR LYMPH NODE Tissue Lung SURGICAL PATHOLOGY Ann Camacho MD 5/28/2021 0740    C : SUPERIOR PULMONARY LIGAMENT Tissue Lung SURGICAL PATHOLOGY Ann Camacho MD 5/28/2021 6420    D : SUBCARINAL LYMPH NODE #1 Tissue Lung SURGICAL PATHOLOGY Ann Camacho MD 5/28/2021 0748    E : SUBCARINAL LYMPH NODE #2 Tissue Lung SURGICAL PATHOLOGY Ann Camacho MD 5/28/2021 0749    F : SUBCARINAL LYMPH NODE #3 Tissue Lung SURGICAL PATHOLOGY Ann Camacho MD 5/28/2021 0750    G : SUBCARINAL LYMPH NODE #4 Tissue Lung SURGICAL PATHOLOGY Ann Camacho MD 5/28/2021 0750    H : LEVEL 10 LYMPH NODE Tissue Lung SURGICAL PATHOLOGY Ann Camacho MD 5/28/2021 0754    I : LEVEL 10 LYMPH NODE #2 Tissue Lung SURGICAL PATHOLOGY Ann Camacho MD 5/28/2021 0756    J : SUPERIOR MEDIASTINAL LYMPH NODE PACKET Tissue Lung SURGICAL PATHOLOGY Ann Camacho MD 5/28/2021 0801    K : 4R LYMPH NODE Tissue Lung SURGICAL PATHOLOGY Ann Camacho MD 5/28/2021 0804    L : BRONCHIAL LYMPH NODE Tissue Lung SURGICAL PATHOLOGY Ann Camacho MD 5/28/2021 0809    M : RIGHT UPPER LOBE Tissue Lung SURGICAL PATHOLOGY Ann Camacho MD 5/28/2021 2414        Implants:  * No implants in log *      Drains: * No LDAs found *    Findings: NSCLC    Electronically signed by Ann Camacho MD on 5/28/2021 at 8:42 AM

## 2021-05-28 NOTE — CONSULTS
RIGHT THORACOSCOPY, RIGHT UPPER LOBE WEDGE RESECTION, LOBECTOMY performed by Charles Daley MD at [facility] HISTORY:   Family History   Problem Relation Age of Onset    Diabetes Mother     Stroke Mother     Lung Cancer Mother     Cancer Father        SOCIAL HISTORY:   Social History     Socioeconomic History    Marital status:      Spouse name: Not on file    Number of children: Not on file    Years of education: Not on file    Highest education level: Not on file   Occupational History    Not on file   Tobacco Use    Smoking status: Former Smoker     Packs/day: 1.00     Years: 30.00     Pack years: 30.00     Types: Cigarettes     Quit date: 2018     Years since quittin.7    Smokeless tobacco: Never Used    Tobacco comment: Per CT Lung Screening order    Vaping Use    Vaping Use: Never used   Substance and Sexual Activity    Alcohol use: Yes     Alcohol/week: 5.0 standard drinks     Types: 5 Cans of beer per week    Drug use: No    Sexual activity: Not on file   Other Topics Concern    Not on file   Social History Narrative    Not on file     Social Determinants of Health     Financial Resource Strain:     Difficulty of Paying Living Expenses:    Food Insecurity:     Worried About Running Out of Food in the Last Year:     [address] in the Last Year:    Transportation Needs:     Lack of Transportation (Medical):      Lack of Transportation (Non-Medical):    Physical Activity:     Days of Exercise per Week:     Minutes of Exercise per Session:    Stress:     Feeling of Stress :    Social Connections:     Frequency of Communication with Friends and Family:     Frequency of Social Gatherings with Friends and Family:     Attends Confucianist Services:     Active Member of Clubs or Organizations:     Attends Club or Organization Meetings:     Marital Status:    Intimate Partner Violence:     Fear of Current or Ex-Partner:     Emotionally Abused:     Physically Abused:     Sexually Abused:      Social History     Tobacco Use   Smoking Status Former Smoker    Packs/day: 1.00    Years: 30.00    Pack years: 30.00    Types: Cigarettes    Quit date: 2018    Years since quittin.7   Smokeless Tobacco Never Used   Tobacco Comment    Per CT Lung Screening order      Social History     Substance and Sexual Activity   Alcohol Use Yes    Alcohol/week: 5.0 standard drinks    Types: 5 Cans of beer per week     Social History     Substance and Sexual Activity   Drug Use No       OCCUPATIONAL HISTORY:    - no asbestos, silica, beryllium exposures     HISTORY:    HOME MEDICATIONS:  Prior to Admission medications    Medication Sig Start Date End Date Taking? Authorizing Provider   DULERA 200-5 MCG/ACT inhaler Inhale 1 puff into the lungs daily  21  Yes Historical Provider, MD   CVS D3 50 MCG ( [place]) CAPS Take 2,000 Units by mouth daily 21   Historical Provider, MD   Multiple Vitamins-Minerals (CENTRUM SILVER PO) Take by mouth daily     Historical Provider, MD   Ginkgo Biloba 40 MG TABS Take by mouth daily     Historical Provider, MD   ARTIFICIAL TEARS 1.4 % ophthalmic solution  18   Historical Provider, MD   Diphenhydramine-APAP, sleep, (TYLENOL PM EXTRA STRENGTH)  MG TABS Take 1 tablet by mouth nightly as needed. otc med     Historical Provider, MD   Ginseng 250 MG CAPS Take 1 capsule by mouth daily.  otc med     Historical Provider, MD       CURRENT MEDICATIONS:  Current Facility-Administered Medications: ropivacaine 0.2% (NAROPIN) Peripheral Nerve Block 200 mL, 200 mL, Nerve block catheter, Continuous  sodium chloride flush 0.9 % injection 10 mL, 10 mL, Intravenous, 2 times per day  sodium chloride flush 0.9 % injection 10 mL, 10 mL, Intravenous, PRN  0.9 % sodium chloride infusion, 25 mL, Intravenous, PRN  ceFAZolin (ANCEF) 2000 mg in sterile water 20 mL IV syringe, 2,000 mg, Intravenous, Q8H  enoxaparin (LOVENOX) injection 40 mg, 40 mg, Subcutaneous, Daily  acetaminophen (TYLENOL) tablet 650 mg, 650 mg, Oral, Q6H  oxyCODONE (ROXICODONE) immediate release tablet 5 mg, 5 mg, Oral, Q4H PRN  ondansetron (ZOFRAN) injection 4 mg, 4 mg, Intravenous, Q6H PRN  sennosides-docusate sodium (SENOKOT-S) 8.6-50 MG tablet 1 tablet, 1 tablet, Oral, BID  magnesium hydroxide (MILK OF MAGNESIA) 400 MG/5ML suspension 30 mL, 30 mL, Oral, Daily PRN  bisacodyl (DULCOLAX) suppository 10 mg, 10 mg, Rectal, Daily PRN  ipratropium-albuterol (DUONEB) nebulizer solution 1 ampule, 1 ampule, Inhalation, Q4H WA  mupirocin (BACTROBAN) 2 % ointment, , Topical, BID  amiodarone (CORDARONE) tablet 200 mg, 200 mg, Oral, BID  magnesium sulfate 1000 mg in dextrose 5% 100 mL IVPB, 1,000 mg, Intravenous, PRN  ketorolac (TORADOL) injection 15 mg, 15 mg, Intravenous, Q6H    IV MEDICATIONS:   ropivacaine 0.2%      sodium chloride         ALLERGIES:  Allergies   Allergen Reactions    Ibuprofen Other (See Comments)     Blood in stool       REVIEW OF SYSTEMS:  General ROS:     - Positive For:     - Negative For: chills, fatigue, fever, malaise, night sweats or sleep disturbance   ENT ROS:      - Positive For:     - Negative For: epistaxis, headaches, sinus pain, sneezing or sore throat   Allergy and Immunology ROS:     - Negative For: nasal congestion, postnasal drip or seasonal allergies   Hematological and Lymphatic ROS:      - Negative For: bleeding problems, bruising, fatigue, night sweats or pallor   Respiratory ROS:      - Positive For:       - Negative For: hemoptysis, pleuritic type chest pains  Cardiovascular ROS:      - Positive For:      - Negative For: chest pain, dyspnea on exertion, irregular heartbeat, loss of consciousness, murmur, orthopnea or palpitations   Gastrointestinal ROS:      - Positive For:     - Negative For: abdominal pain, appetite loss, blood in stools, change in bowel habits, change in stools, constipation, diarrhea, hematemesis, melena, nausea/vomiting or stool 260 [Urine:160; Chest Tube:100]    Intake/Output Summary (Last 24 hours) at 5/28/2021 1517  Last data filed at 5/28/2021 1430  Gross per 24 hour   Intake 700 ml   Output 260 ml   Net 440 ml       General Appearance: alert and oriented to person, place and time, well-developed and   well-nourished, in no acute distress   Eyes: pupils equal, round, and reactive to light, extraocular eye movements intact, conjunctivae normal and sclera anicteric   Neck: neck supple and non tender without mass, no thyromegaly, no thyroid nodules and no cervical adenopathy   Pulmonary/Chest: decreased breath sounds, no accessory muscles of inspiration, no focal wheezes  Cardiovascular: normal rate, regular rhythm, normal S1 and S2, no murmurs, rubs, clicks or gallops, distal pulses intact, no carotid bruits, no murmurs, no gallops, no carotid bruits and no JVD   Abdomen: soft, non-tender, non-distended, normal bowel sounds, no masses or organomegaly   Extremities: no cyanosis, no clubbing, no edema  Musculoskeletal: normal range of motion, no joint swelling, deformity or tenderness   Neurologic: reflexes normal and symmetric, no cranial nerve deficit noted    LABS/IMAGING:    CBC:  Lab Results   Component Value Date    WBC 20.6 (H) 05/28/2021    HGB 14.0 05/28/2021    HCT 42.6 05/28/2021    MCV 96.2 05/28/2021     05/28/2021    LYMPHOPCT 19 (L) 10/26/2010    RBC 4.43 05/28/2021    MCH 31.6 05/28/2021    MCHC 32.9 05/28/2021    RDW 14.0 05/28/2021    MONOPCT 6 10/26/2010    BASOPCT 0 10/26/2010    NEUTROABS 8.50 (H) 10/26/2010    LYMPHSABS 2.36 10/26/2010    MONOSABS 0.71 10/26/2010    EOSABS 0.24 10/26/2010    BASOSABS 0.00 10/26/2010       Recent Labs     05/28/21  0940 05/25/21  1050   WBC 20.6* 7.9   HGB 14.0 14.7   HCT 42.6 45.7   MCV 96.2 97.6    339       BMP:   Recent Labs     05/28/21  0940      K 3.8      CO2 21*   BUN 22*   CREATININE 0.7       MG:   Lab Results   Component Value Date    MG 2.3 02/07/2012     Ca/Phos:   Lab Results   Component Value Date    CALCIUM 8.1 (L) 05/28/2021     Amylase: No results found for: AMYLASE  Lipase: No results found for: LIPASE  LIVER PROFILE: No results for input(s): AST, ALT, LIPASE, BILIDIR, BILITOT, ALKPHOS in the last 72 hours. Invalid input(s): AMYLASE,  ALB    PT/INR: No results for input(s): PROTIME, INR in the last 72 hours. APTT: No results for input(s): APTT in the last 72 hours. Cardiac Enzymes:  No results found for: CKTOTAL, CKMB, CKMBINDEX, TROPONINI    Hgb A1C: No results found for: LABA1C  No results found for: EAG  AMBAR: No results found for: AMBAR  ESR: No results found for: SEDRATE  CRP: No results found for: CRP  D Dimer: No results found for: DDIMER  Folate and B12:   Lab Results   Component Value Date    [id number] 359 10/17/2012   ,   Lab Results   Component Value Date    FOLATE 18.2 10/17/2012       Lactic Acid: No results found for: LACTA  Ammonia:   Cortisol:  Thyroid Studies:  Lab Results   Component Value Date    TSH 1.432 10/17/2012     PET/CT 5/58/1755:  1.  Metabolic activity associated with the pulmonary nodule in the posterior   right upper lobe described on recent CT scans concerning for primary lung   cancer.       2.  No metabolically active metastatic disease. ASSESSMENT:  1.) RUL Non-Small Cell Lung Carcinoma  - PET/CT completed 4/13/2021 that noted PET-avid lesion  - 5/28/2021 wedge resection positive for NSCLC with negative margins   - procedures completed 5/28/2021:  1. Right robotic VATS. 2.  Robotic right upper lobe wedge resection. 3.  Robotic right upper lobectomy. 4.  Mediastinal lymph node dissection. 4.  Intercostal nerve block.     PLAN:  1.) s/p RUL wedge resection with negative margins with tissue positive for non small javi llung carcinoma   2.) chest tube per CT surgery   3.) O2, IS, pain control   4.) await D/C planning   5.) hematology/oncology consultation for follow up and management   6.) DVT

## 2021-05-28 NOTE — OP NOTE
[address]                  [address] [place],  [facility]                                OPERATIVE REPORT    PATIENT NAME: Tete Lopes                    :        1965  MED REC NO:   [id number]                            ROOM:  ACCOUNT NO:   [de-identified]                           ADMIT DATE: 2021  PROVIDER:     Brady Rincon MD    DATE OF PROCEDURE:  2021    PREOPERATIVE DIAGNOSIS:  Lung nodule, right upper lobe, weakly PET  positive. POSTOPERATIVE DIAGNOSIS:  Lung nodule, right upper lobe, weakly PET  positive, non-small cell lung cancer. INDICATIONS:  Lung nodule, right upper lobe, weakly PET positive,  non-small cell lung cancer. SURGEON:  Brady Rincon MD    ASSISTANT:  AVANI Ortiz (no other resident who was adequately  trained was able to assist)  Ricardo Gomez was present assisting  throughout the entire operation from the first incision to the last  suture and all the management of the ports and instruments while I was  at the console. SECOND ASSISTANT:  AVANI Kelsey    COMPLICATIONS:  None, tolerated well. ESTIMATED BLOOD LOSS:  Less than 50 mL. ANESTHESIA:  General endotracheal.    ANESTHESIA ATTENDING:  Kyree Celis DO    SPECIMEN OBTAINED:  Includes right upper lobe wedge, right upper  lobectomy, and multiple lymph node stations. Please refer to path  report. OPERATIONS:  1. Right robotic VATS. 2.  Robotic right upper lobe wedge resection. 3.  Robotic right upper lobectomy. 4.  Mediastinal lymph node dissection. 4.  Intercostal nerve block. DRAINS:  One 28-Andorran chest tube. HISTORY:  This is a 61-year-old man who is found to have a right upper  lobe lung nodule. This is growing in size. PET scan was weakly  positive. It did appear ominous. He was referred for resection.   The  patient was described the procedure in full including risks and  complications including but not limited to bleeding, infection, need for  reoperation, hemothorax, pneumothorax, stroke, myocardial infarction,  and death; and the patient agreed to proceed. FINDINGS:  On right robotic VATS, there was no pleural effusion or  pleural studding making this an R0 resection. The mass was easily  identifiable and wedge resection revealed non-small cell lung cancer. Robotic right upper lobectomy was performed. The right lower lobe and  right middle lobe came up nicely at the end of the operation with no  torsion. The patient was extubated on the table and transferred to  recovery room in stable condition. DESCRIPTION OF OPERATION:  After adequate informed consent was obtained  and adequate preoperative antibiotics were given, the patient was  brought to the operating room in stable condition where he was laid in  supine position and induced under general endotracheal anesthesia by  Anesthesia staff. Tomlinson catheter and adequate monitoring lines were  placed. The patient was turned in left lateral decubitus position with  right side up. All pressure points were padded. Right chest was  prepped and draped in the usual sterile fashion. The 4 robotic ports  and a 12-mm access port were placed in the usual fashion. The robot was  docked and visualization inside the chest was adequate. The above  findings were noted. There were some mild adhesions apically. These  were taken down using bipolar. The mass was identifiable and was  grasped and wedged out using Endo-COLLIN staplers x2. It was placed in the  bag and removed. Frozen section revealed non-small cell lung cancer. Therefore, I started my dissection inferiorly and released the inferior  pulmonary ligament, carried this posteriorly in the subcarinal space. Lymph nodes were removed along the way. I carried this superiorly in  the superior mediastinal lymph node packet as well as 10R lymph nodes.    I then dissected out the anterior hilum and identified the right middle  lobe vein and this was preserved. The superior vein drainage from the  upper lobe was encircled and taken using an Endo-COLLIN stapler. I then  dissected out the anterior trunk, and this was taken using an Endo-COLLIN  stapler vascular load. I then developed a minor fissure as it was  underdeveloped, and all the remainder of the bronchus and the posterior  ascending, which were taken then using Endo-COLLIN staplers and the  posterior major fissure, which was taken using Endo-COLLIN staplers. The  lobe was put in the bag and removed. Frozen section of the bronchial  margin was negative. Hemostasis was noted. ProGEL was sprayed  overlying the raw surfaces. All the ports were removed under direct  vision. A single 28-Ukrainian chest tube was placed at the apex. This was  secured appropriately. Intercostal nerve block was done around the  incision. The incision was closed in multiple layers of absorbable  stitch after watching the lobes inflate and noting that the right middle  lobe inflated nicely without any torsion. Dressings were applied over  top. The patient tolerated the procedure well. The patient was  extubated on the table and transferred to recovery room in stable  condition. All sponges, instruments, and needle counts were correct at  the end of the case. Da Mckeon MD    D: 05/28/2021 8:56:24       T: 05/28/2021 10:35:14     [email or web address]  Job#: [id number]     Doc#: [id number]    CC:   MD Anali Bhakta MD

## 2021-05-28 NOTE — CONSULTS
Department of Medicine  Division of Hematology/Oncology  Attending Consult Note       Reason for Consult:  Lung ca   Requesting Physician:  Dr. Wei Common:      History Obtained From:  Patient and EMR    HISTORY OF PRESENT ILLNESS:      The patient is a 60 y.o. male with significant past medical history of COPD and tobacco abuse 28 pack years who follows with Dr. Bernadette Willis for a RUL lung nodule. This was noted to increase in size on most recent CT Chest from 2/18/2021 to 7mm in size. Subsequent PET CT on 4/13/2021 revealed SUV uptake. He was referred to Dr. Juan A Sharma and underwent RUL wedge resection on 5/28/2021, Right robotic VATS/Robotic RUL wedge resection/Robotic RULobectomy/Mediastinal lymph node dissection/Intercostal nerve block. Preliminary consistent with NSCLC with negative margins. Oncology has been consulted. He is recovering well from surgery with expected chest wall discomfort, has drain tube in place. Denies any GI or  blood loss or any easy bruising. Denies any headaches, blurry vision, dizziness, lightheadedness. Denies any chest pain, palpitations, shortness of breath, dyspnea with exertion. Denies any abdominal pain, nausea, vomiting, diarrhea or constipation. Denies any lymph node swellings, lumps, bumps, fevers, chills, night sweats or unintentional weight loss.     Past Medical History:        Diagnosis Date    Anxiety     COPD with asthma ([address]     Lung nodule     Vitamin D deficiency    RUL lung ca   Chronic bronchitis  Chronic pansinusitis  Deviated nasal septum    Past Surgical History:        Procedure Laterality Date    COLONOSCOPY      EYE SURGERY  1988    vision lazy eye    HEEL SPUR SURGERY  02/15/2011    rt heel    KNEE SURGERY      NC NASAL/SINUS NDSC SURG W/DILATION FRONTAL SINUS N/A 07/25/2018    FUNCTIONAL  ENDOSCOPIC SINUS  SURGERY WITH BILATERAL MAXILLARY, FRONTAL, AND SPHENOID ANTROSTOMIES; SUBMUCOSOID RESECTION OF INFERIOR TURBINATES AND SEPTOPLASTY performed by Nadir Stevens DO at [address] Right 5/28/2021    BRONCH, ROBOTIC VIDEO ASSISTED RIGHT THORACOSCOPY, RIGHT UPPER LOBE WEDGE RESECTION, LOBECTOMY performed by Robina Alcantar MD at [address] [place] [address]       Current Medications:    Current Facility-Administered Medications: ropivacaine 0.2% (NAROPIN) Peripheral Nerve Block 200 mL, 200 mL, Nerve block catheter, Continuous  sodium chloride flush 0.9 % injection 10 mL, 10 mL, Intravenous, 2 times per day  sodium chloride flush 0.9 % injection 10 mL, 10 mL, Intravenous, PRN  0.9 % sodium chloride infusion, 25 mL, Intravenous, PRN  ceFAZolin (ANCEF) 2000 mg in sterile water 20 mL IV syringe, 2,000 mg, Intravenous, Q8H  enoxaparin (LOVENOX) injection 40 mg, 40 mg, Subcutaneous, Daily  acetaminophen (TYLENOL) tablet 650 mg, 650 mg, Oral, Q6H  oxyCODONE (ROXICODONE) immediate release tablet 5 mg, 5 mg, Oral, Q4H PRN  ondansetron (ZOFRAN) injection 4 mg, 4 mg, Intravenous, Q6H PRN  sennosides-docusate sodium (SENOKOT-S) 8.6-50 MG tablet 1 tablet, 1 tablet, Oral, BID  magnesium hydroxide (MILK OF MAGNESIA) 400 MG/5ML suspension 30 mL, 30 mL, Oral, Daily PRN  bisacodyl (DULCOLAX) suppository 10 mg, 10 mg, Rectal, Daily PRN  ipratropium-albuterol (DUONEB) nebulizer solution 1 ampule, 1 ampule, Inhalation, Q4H WA  mupirocin (BACTROBAN) 2 % ointment, , Topical, BID  amiodarone (CORDARONE) tablet 200 mg, 200 mg, Oral, BID  magnesium sulfate 1000 mg in dextrose 5% 100 mL IVPB, 1,000 mg, Intravenous, PRN  ketorolac (TORADOL) injection 15 mg, 15 mg, Intravenous, Q6H    Allergies:  Ibuprofen    Social History:   Social History     Socioeconomic History    Marital status:      Spouse name: Not on file    Number of children: Not on file    Years of education: Not on file    Highest education level: Not on file   Occupational History    Not on file   Tobacco Use    Smoking status: Former Smoker     Packs/day: 1.00     Years: 30.00     Pack years: 30.00     Types: Cigarettes     Quit date: 2018     Years since quittin.7    Smokeless tobacco: Never Used    Tobacco comment: Per CT Lung Screening order    Vaping Use    Vaping Use: Never used   Substance and Sexual Activity    Alcohol use: Yes     Alcohol/week: 5.0 standard drinks     Types: 5 Cans of beer per week    Drug use: No    Sexual activity: Not on file   Other Topics Concern    Not on file   Social History Narrative    Not on file     Social Determinants of Health     Financial Resource Strain:     Difficulty of Paying Living Expenses:    Food Insecurity:     Worried About Running Out of Food in the Last Year:     [address] in the Last Year:    Transportation Needs:     Lack of Transportation (Medical):      Lack of Transportation (Non-Medical):    Physical Activity:     Days of Exercise per Week:     Minutes of Exercise per Session:    Stress:     Feeling of Stress :    Social Connections:     Frequency of Communication with Friends and Family:     Frequency of Social Gatherings with Friends and Family:     Attends [employer] Services:     Active Member of Clubs or Organizations:     Attends Club or Organization Meetings:     Marital Status:    Intimate Partner Violence:     Fear of Current or Ex-Partner:     Emotionally Abused:     Physically Abused:     Sexually Abused:        Family History:         Problem Relation Age of Onset    Diabetes Mother     Stroke Mother     Lung Cancer Mother     Cancer Father        REVIEW OF SYSTEMS:    CONSTITUTIONAL:  negative for fatigue, fevers, chills, sweats and weight loss  HEENT:  negative for hearing loss, epistaxis and sore throat  RESPIRATORY:  negative for cough with sputum, dyspnea, wheezing, hemoptysis and chest pain  CARDIOVASCULAR:  negative for chest pain, palpitations, PND, edema, syncope, dyspnea  GASTROINTESTINAL:  negative for nausea, vomiting, diarrhea, constipation, abdominal pain, jaundice, reflux, hematemesis and hemtochezia  GENITOURINARY:  negative for frequency, dysuria and urinary incontinence  INTEGUMENT/BREAST:  negative for rash and pruritus  HEMATOLOGIC/LYMPHATIC:  negative for easy bruising, bleeding, lymphadenopathy and petechiae  MUSCULOSKELETAL:  negative for myalgias, arthralgias and pain  NEUROLOGICAL:  negative for headaches, dizziness, seizures, gait problems and syncope  BEHAVIOR/PSYCH:  negative for depressed mood and anxiety    PHYSICAL EXAM:      Vitals:  /81   Pulse 72   Temp 96.8 °F (36 °C) (Temporal)   Resp 16   Ht 6' 2\" (1.88 m)   Wt 196 lb (88.9 kg)   SpO2 99%   BMI 25.16 kg/m²     CONSTITUTIONAL:  awake, alert, cooperative, no apparent distress, and appears stated age  HEENT:  Normocepalic, atraumatic, no obvious abnormality. Lids and lashes normal, PERRLA, EOMI, sclera clear, conjunctiva normal  NECK:  Supple, full ROM, symmetrical, trachea midline, no adenopathy, thyroid symmetric, not enlarged and no tenderness, skin normal  HEMATOLOGIC/LYMPHATICS:  no cervical or supraclavicular lymphadenopathy  LUNGS:  Chest tube in place, No increased work of breathing, good air exchange, clear to auscultation bilaterally, no crackles or wheezing  CARDIOVASCULAR:  Normal apical impulse, regular rate and rhythm, normal S1 and S2, no S3 or S4, and no murmur noted  ABDOMEN:  No scars, normal BS, soft, non-distended, non-tender, no masses palpated, no hepatosplenomegaly  MUSCULOSKELETAL:  No redness, warmth, or swelling of the joints; motor strength is 5 out of 5 in all extremities bilaterally; tone is normal  NEUROLOGIC:  Awake, alert, oriented to name, place and time. Cranial nerves II-XII are grossly intact. Motor is 5 out of 5 bilaterally. Sensory is intact.       DATA:      CMP:    Lab Results   Component Value Date     05/28/2021    K 3.8 05/28/2021     05/28/2021    CO2 21 05/28/2021    BUN 22 05/28/2021    PROT 6.4 05/25/2021       CBC:    Recent Labs 05/28/21  0940   WBC 20.6*   HGB 14.0          Radiology:    XR CHEST PORTABLE    Result Date: 5/28/2021  EXAMINATION: ONE XRAY VIEW OF THE CHEST 5/28/2021 10:10 am COMPARISON: CT of the chest, 02/28/2021. HISTORY: ORDERING SYSTEM PROVIDED HISTORY: evaluate lung fields, tube placement TECHNOLOGIST PROVIDED HISTORY: Reason for exam:->evaluate lung fields, tube placement What reading provider will be dictating this exam?->CRC FINDINGS: There is a right hilar lobular opacity, the etiology of which is unclear. No mass or lymphadenopathy was seen in this region on the previous CT from 02/18/2021. A right chest tube is present. There is right-sided pneumomediastinum. Subcutaneous emphysema is seen along the right chest wall. No definite pneumothorax is seen. The lungs are grossly clear. 1. Right-sided pneumomediastinum and right chest wall subcutaneous emphysema. No significant pneumothorax seen. Right chest tube in situ. 2. Lobular opacity in the right hilar region, the etiology of which is unclear. No mass or lymphadenopathy was seen in this region on the previous CT. If indicated, CT may be obtained for further evaluation. IMPRESSION:   60year old gentleman with 7 mm RUL nodule s/p RUL wedge resection on 5/28/2021. Preliminary consistent with NSCLC with negative margins. COPD  Hx of tobacco abuse    RECOMMENDATIONS:  Reviewed available documentation imaging and op report confirming non-small cell ca on preliminary. This appears to be an early stage I Lung ca and would not require adjuvant systemic chemotherapy, but will await pathology to confirm plan. Recommend surveillance imaging. Thank you for the consultation. He can follow up with me as outpatient.       Electronically signed by Joan Simpson MD on 5/28/2021 at 4:35 PM

## 2021-05-28 NOTE — ANESTHESIA POSTPROCEDURE EVALUATION
Department of Anesthesiology  Postprocedure Note    Patient: Rudy Son  MRN: [id number]  YOB: 1965  Date of evaluation: 5/28/2021  Time:  2:24 PM     Procedure Summary     Date: 05/28/21 Room / Location: [facility] OR 05 / [facility]    Anesthesia Start: 4857 Anesthesia Stop: 0028    Procedure: Delta Brittle, ROBOTIC VIDEO ASSISTED RIGHT THORACOSCOPY, RIGHT UPPER LOBE WEDGE RESECTION, LOBECTOMY (Right Chest) Diagnosis: (LUNG NODULE)    Surgeons: Mazin Downs MD Responsible Provider: Kendrick Hopson DO    Anesthesia Type: general ASA Status: 3          Anesthesia Type: general    Todd Phase I: Todd Score: 9    Todd Phase II:      Last vitals: Reviewed and per EMR flowsheets.        Anesthesia Post Evaluation    Patient location during evaluation: PACU  Patient participation: complete - patient participated  Level of consciousness: awake and alert  Airway patency: patent  Nausea & Vomiting: no nausea and no vomiting  Complications: no  Cardiovascular status: blood pressure returned to baseline  Respiratory status: acceptable  Hydration status: euvolemic

## 2021-05-28 NOTE — ANESTHESIA PROCEDURE NOTES
Arterial Line:    An arterial line was placed using surface landmarks, in the OR for the following indication(s): continuous blood pressure monitoring and blood sampling needed. A 20 gauge (size), 1 and 3/4 inch (length), Arrow (type) catheter was placed, Seldinger technique used, into the left radial artery, secured by tape and Tegaderm. Anesthesia type: General    Events:  patient tolerated procedure well with no complications.   Anesthesiologist: Hank Parnell DO  Resident/CRNA: EDWAR Baker - CRNA  Performed: Resident/CRNA   Preanesthetic Checklist  Completed: patient identified, IV checked, site marked, risks and benefits discussed, surgical consent, monitors and equipment checked, pre-op evaluation, timeout performed, anesthesia consent given, oxygen available and patient being monitored

## 2021-05-28 NOTE — PROGRESS NOTES
Per patient: Please call [phone] for Vinny Ramirez (wife). Please inform of surgery results and visiting hours and room number.

## 2021-05-28 NOTE — ANESTHESIA PRE PROCEDURE
Department of Anesthesiology  Preprocedure Note       Name:  Neyda Parisi   Age:  60 y.o.  :  1965                                          MRN:  [id number]         Date:  2021      Surgeon: Noris Peterson):  Robina Alcantar MD    Procedure: Procedure(s):  BRONCH, ROBOTIC VIDEO ASSISTED RIGHT THORACOSCOPY, RIGHT UPPER LOBE WEDGE RESECTION, POSSIBLE LOBECTOMY    Medications prior to admission:   Prior to Admission medications    Medication Sig Start Date End Date Taking? Authorizing Provider   DULERA 200-5 MCG/ACT inhaler Inhale 1 puff into the lungs daily  21  Yes Historical Provider, MD   CVS D3 50 MCG ( [place]) CAPS Take 2,000 Units by mouth daily 21   Historical Provider, MD   Multiple Vitamins-Minerals (CENTRUM SILVER PO) Take by mouth daily     Historical Provider, MD   Ginkgo Biloba 40 MG TABS Take by mouth daily     Historical Provider, MD   ARTIFICIAL TEARS 1.4 % ophthalmic solution  18   Historical Provider, MD   Diphenhydramine-APAP, sleep, (TYLENOL PM EXTRA STRENGTH)  MG TABS Take 1 tablet by mouth nightly as needed. otc med     Historical Provider, MD   Ginseng 250 MG CAPS Take 1 capsule by mouth daily.  otc med     Historical Provider, MD       Current medications:    Current Facility-Administered Medications   Medication Dose Route Frequency Provider Last Rate Last Admin    0.9 % sodium chloride infusion   Intravenous Continuous Robina Alcantar MD        0.9 % sodium chloride infusion  25 mL Intravenous PRN Robina Alcantar MD        ceFAZolin (ANCEF) 2000 mg in sterile water 20 mL IV syringe  2,000 mg Intravenous On Call to Felisha Webber MD        chlorhexidine (HIBICLENS) 4 % liquid   Topical Once Robina Alcantar MD        chlorhexidine (PERIDEX) 0.12 % solution 15 mL  15 mL Mouth/Throat Once Robina Alcantar MD        sodium chloride flush 0.9 % injection 10 mL  10 mL Intravenous 2 times per day Robina Alcantar MD        sodium chloride flush 0.9 % injection 10 mL  10 mL Status: Time of last liquid consumption: 2240                        Time of last solid consumption: 1900                        Date of last liquid consumption: 05/27/21                        Date of last solid food consumption: 05/27/21    BMI:   Wt Readings from Last 3 Encounters:   05/28/21 196 lb (88.9 kg)   05/25/21 196 lb (88.9 kg)   05/18/21 196 lb (88.9 kg)     Body mass index is 25.16 kg/m². CBC:   Lab Results   Component Value Date    WBC 7.9 05/25/2021    RBC 4.68 05/25/2021    HGB 14.7 05/25/2021    HCT 45.7 05/25/2021    MCV 97.6 05/25/2021    RDW 13.8 05/25/2021     05/25/2021       CMP:   Lab Results   Component Value Date     05/25/2021    K 3.8 05/25/2021     05/25/2021    CO2 26 05/25/2021    BUN 17 05/25/2021    CREATININE 1.0 05/25/2021    GFRAA >60 05/25/2021    LABGLOM >60 05/25/2021    GLUCOSE 124 05/25/2021    GLUCOSE 91 02/07/2012    PROT 6.4 05/25/2021    CALCIUM 9.0 05/25/2021    BILITOT <0.2 05/25/2021    ALKPHOS 65 05/25/2021    AST 21 05/25/2021    ALT 26 05/25/2021       POC Tests: No results for input(s): POCGLU, POCNA, POCK, POCCL, POCBUN, POCHEMO, POCHCT in the last 72 hours.     Coags:   Lab Results   Component Value Date    PROTIME 10.0 05/25/2021    PROTIME 10.1 10/26/2010    INR 0.9 05/25/2021    APTT 30.7 05/25/2021       HCG (If Applicable): No results found for: PREGTESTUR, PREGSERUM, HCG, HCGQUANT     ABGs: No results found for: PHART, PO2ART, BHV1RIX, ZCZ4FYJ, BEART, H9LTDTQU     Type & Screen (If Applicable):  No results found for: LABABO, LABRH    Drug/Infectious Status (If Applicable):  No results found for: HIV, HEPCAB    COVID-19 Screening (If Applicable): No results found for: COVID19        Anesthesia Evaluation  Patient summary reviewed and Nursing notes reviewed no history of anesthetic complications:   Airway:         Dental:          Pulmonary:       (-) COPD                           Cardiovascular:                      Neuro/Psych: GI/Hepatic/Renal:             Endo/Other:                     Abdominal:           Vascular:                                        Anesthesia Plan        [facility], RN   5/28/2021

## 2021-05-28 NOTE — ANESTHESIA PROCEDURE NOTES
Peripheral Block    Patient location during procedure: pre-op  Staffing  Performed: anesthesiologist   Anesthesiologist: Hank Parnell DO  Preanesthetic Checklist  Completed: patient identified, IV checked, site marked, risks and benefits discussed, surgical consent, monitors and equipment checked, pre-op evaluation, timeout performed, anesthesia consent given, oxygen available and patient being monitored  Peripheral Block  Patient position: prone  Prep: ChloraPrep  Patient monitoring: cardiac monitor, continuous pulse ox, IV access and frequent blood pressure checks  Block type: Erector spinae  Laterality: right  Injection technique: single-shot  Guidance: ultrasound guided  Local infiltration: lidocaine  Infiltration strength: 2 %  Dose: 5 mL  Provider prep: mask  Local infiltration: lidocaine  Needle  Needle type: combined needle/nerve stimulator   Needle gauge: 22 G  Needle length: 8 cm  Needle localization: ultrasound guidance  Catheter type: open end  Assessment  Injection assessment: negative aspiration for heme, no paresthesia on injection and local visualized surrounding nerve on ultrasound  Slow fractionated injection: yes  Hemodynamics: stable  Additional Notes  Time out performed. Catheter threaded easily.   Well tolerated  Medications Administered  Ropivacaine (NAROPIN) injection 0.5%, 30 mL  Reason for block: post-op pain management and at surgeon's request

## 2021-05-28 NOTE — PROGRESS NOTES
CTS PACU Progress Note:      Brief HPI: Awake, alert. No complaints. Denies CP, palpitations, SOB at rest, dizziness/lightheadedness. Vitals:    05/28/21 0930 05/28/21 0945 05/28/21 1000 05/28/21 1015   BP: (!) 131/93 (!) 143/93 129/87 125/84   Pulse: 82 75 78 80   Resp: 18 17 23 22   Temp:    98 °F (36.7 °C)   TempSrc:       SpO2: 100% 100% 100% 99%   Weight:       Height:               Intake/Output Summary (Last 24 hours) at 5/28/2021 1044  Last data filed at 5/28/2021 0850  Gross per 24 hour   Intake 400 ml   Output --   Net 400 ml       Recent Labs     05/25/21  1050 05/28/21  0940   WBC 7.9 20.6*   HGB 14.7 14.0   HCT 45.7 42.6    318     Recent Labs     05/25/21  1050 05/28/21  0940   BUN 17 22*   CREATININE 1.0 0.7           PE  Cardiac: RRR  Lungs: decreased bases  Chest incision with DSD C/D/I. Chest tubes x 1 present and secure. Abd: Soft, nontender, +BS  Ext: JOHNSON        A/P: Day of Surgery     Stable s/p  Right robotic VATS/Robotic RUL wedge resection/Robotic RULobectomy/Mediastinal lymph node dissection/Intercostal nerve block     Immediate post-operative hgb stable at 14.0  Remaining labs reviewed  Maintaining NSR  VSS  CXR reviewed  CT drainage adequate for immediate post operative period.    + airleak noted         Dispo: continue transfer to PCCU      This patient's case and care plan was discussed with the attending surgeon

## 2021-05-29 LAB
ANION GAP SERPL CALCULATED.3IONS-SCNC: 11 MMOL/L (ref 7–16)
BUN BLDV-MCNC: 20 MG/DL (ref 6–20)
CALCIUM SERPL-MCNC: 9.1 MG/DL (ref 8.6–10.2)
CHLORIDE BLD-SCNC: 100 MMOL/L (ref 98–107)
CO2: 25 MMOL/L (ref 22–29)
CREAT SERPL-MCNC: 0.8 MG/DL (ref 0.7–1.2)
GFR AFRICAN AMERICAN: >60
GFR NON-AFRICAN AMERICAN: >60 ML/MIN/1.73
GLUCOSE BLD-MCNC: 115 MG/DL (ref 74–99)
HCT VFR BLD CALC: 41.4 % (ref 37–54)
HEMOGLOBIN: 13.4 G/DL (ref 12.5–16.5)
MCH RBC QN AUTO: 31.5 PG (ref 26–35)
MCHC RBC AUTO-ENTMCNC: 32.4 % (ref 32–34.5)
MCV RBC AUTO: 97.4 FL (ref 80–99.9)
PDW BLD-RTO: 14.2 FL (ref 11.5–15)
PLATELET # BLD: 295 E9/L (ref 130–450)
PMV BLD AUTO: 9.5 FL (ref 7–12)
POTASSIUM SERPL-SCNC: 4.2 MMOL/L (ref 3.5–5)
RBC # BLD: 4.25 E12/L (ref 3.8–5.8)
SODIUM BLD-SCNC: 136 MMOL/L (ref 132–146)
WBC # BLD: 16.5 E9/L (ref 4.5–11.5)

## 2021-05-29 PROCEDURE — 85027 COMPLETE CBC AUTOMATED: CPT

## 2021-05-29 PROCEDURE — 2580000003 HC RX 258: Performed by: NURSE PRACTITIONER

## 2021-05-29 PROCEDURE — 94640 AIRWAY INHALATION TREATMENT: CPT

## 2021-05-29 PROCEDURE — 6370000000 HC RX 637 (ALT 250 FOR IP): Performed by: NURSE PRACTITIONER

## 2021-05-29 PROCEDURE — 6360000002 HC RX W HCPCS: Performed by: PHYSICIAN ASSISTANT

## 2021-05-29 PROCEDURE — 80048 BASIC METABOLIC PNL TOTAL CA: CPT

## 2021-05-29 PROCEDURE — 2700000000 HC OXYGEN THERAPY PER DAY

## 2021-05-29 PROCEDURE — 6360000002 HC RX W HCPCS: Performed by: NURSE PRACTITIONER

## 2021-05-29 PROCEDURE — 36415 COLL VENOUS BLD VENIPUNCTURE: CPT

## 2021-05-29 PROCEDURE — 2140000000 HC CCU INTERMEDIATE R&B

## 2021-05-29 RX ORDER — OXYCODONE HYDROCHLORIDE 5 MG/1
5 TABLET ORAL EVERY 4 HOURS PRN
Status: DISCONTINUED | OUTPATIENT
Start: 2021-05-29 | End: 2021-06-01 | Stop reason: HOSPADM

## 2021-05-29 RX ORDER — OXYCODONE HYDROCHLORIDE 10 MG/1
10 TABLET ORAL EVERY 4 HOURS PRN
Status: DISCONTINUED | OUTPATIENT
Start: 2021-05-29 | End: 2021-06-01 | Stop reason: HOSPADM

## 2021-05-29 RX ORDER — LIDOCAINE 4 G/G
2 PATCH TOPICAL DAILY
Status: DISCONTINUED | OUTPATIENT
Start: 2021-05-29 | End: 2021-06-01 | Stop reason: HOSPADM

## 2021-05-29 RX ADMIN — ACETAMINOPHEN 650 MG: 325 TABLET ORAL at 21:45

## 2021-05-29 RX ADMIN — MUPIROCIN: 20 OINTMENT TOPICAL at 21:45

## 2021-05-29 RX ADMIN — SENNOSIDES AND DOCUSATE SODIUM 1 TABLET: 8.6; 5 TABLET ORAL at 08:07

## 2021-05-29 RX ADMIN — SODIUM CHLORIDE, PRESERVATIVE FREE 10 ML: 5 INJECTION INTRAVENOUS at 21:45

## 2021-05-29 RX ADMIN — OXYCODONE 5 MG: 5 TABLET ORAL at 04:16

## 2021-05-29 RX ADMIN — OXYCODONE HYDROCHLORIDE 10 MG: 10 TABLET ORAL at 10:31

## 2021-05-29 RX ADMIN — SODIUM CHLORIDE, PRESERVATIVE FREE 10 ML: 5 INJECTION INTRAVENOUS at 13:56

## 2021-05-29 RX ADMIN — SENNOSIDES AND DOCUSATE SODIUM 1 TABLET: 8.6; 5 TABLET ORAL at 21:45

## 2021-05-29 RX ADMIN — ACETAMINOPHEN 650 MG: 325 TABLET ORAL at 15:41

## 2021-05-29 RX ADMIN — OXYCODONE HYDROCHLORIDE 10 MG: 10 TABLET ORAL at 19:59

## 2021-05-29 RX ADMIN — OXYCODONE HYDROCHLORIDE 10 MG: 10 TABLET ORAL at 15:41

## 2021-05-29 RX ADMIN — IPRATROPIUM BROMIDE AND ALBUTEROL SULFATE 1 AMPULE: .5; 3 SOLUTION RESPIRATORY (INHALATION) at 17:36

## 2021-05-29 RX ADMIN — MUPIROCIN: 20 OINTMENT TOPICAL at 08:08

## 2021-05-29 RX ADMIN — SODIUM CHLORIDE, PRESERVATIVE FREE 10 ML: 5 INJECTION INTRAVENOUS at 08:08

## 2021-05-29 RX ADMIN — IPRATROPIUM BROMIDE AND ALBUTEROL SULFATE 1 AMPULE: .5; 3 SOLUTION RESPIRATORY (INHALATION) at 21:05

## 2021-05-29 RX ADMIN — KETOROLAC TROMETHAMINE 15 MG: 30 INJECTION, SOLUTION INTRAMUSCULAR; INTRAVENOUS at 19:59

## 2021-05-29 RX ADMIN — IPRATROPIUM BROMIDE AND ALBUTEROL SULFATE 1 AMPULE: .5; 3 SOLUTION RESPIRATORY (INHALATION) at 09:53

## 2021-05-29 RX ADMIN — KETOROLAC TROMETHAMINE 15 MG: 30 INJECTION, SOLUTION INTRAMUSCULAR; INTRAVENOUS at 06:37

## 2021-05-29 RX ADMIN — AMIODARONE HYDROCHLORIDE 200 MG: 200 TABLET ORAL at 08:07

## 2021-05-29 RX ADMIN — OXYCODONE 5 MG: 5 TABLET ORAL at 00:36

## 2021-05-29 RX ADMIN — AMIODARONE HYDROCHLORIDE 200 MG: 200 TABLET ORAL at 21:45

## 2021-05-29 RX ADMIN — KETOROLAC TROMETHAMINE 15 MG: 30 INJECTION, SOLUTION INTRAMUSCULAR; INTRAVENOUS at 13:57

## 2021-05-29 RX ADMIN — KETOROLAC TROMETHAMINE 15 MG: 30 INJECTION, SOLUTION INTRAMUSCULAR; INTRAVENOUS at 00:58

## 2021-05-29 RX ADMIN — ENOXAPARIN SODIUM 40 MG: 40 INJECTION SUBCUTANEOUS at 08:08

## 2021-05-29 RX ADMIN — ACETAMINOPHEN 650 MG: 325 TABLET ORAL at 08:07

## 2021-05-29 ASSESSMENT — PAIN DESCRIPTION - ONSET: ONSET: AWAKENED FROM SLEEP

## 2021-05-29 ASSESSMENT — PAIN DESCRIPTION - ORIENTATION
ORIENTATION: RIGHT
ORIENTATION: RIGHT

## 2021-05-29 ASSESSMENT — PAIN SCALES - GENERAL
PAINLEVEL_OUTOF10: 5
PAINLEVEL_OUTOF10: 8
PAINLEVEL_OUTOF10: 9
PAINLEVEL_OUTOF10: 7
PAINLEVEL_OUTOF10: 10
PAINLEVEL_OUTOF10: 8
PAINLEVEL_OUTOF10: 5
PAINLEVEL_OUTOF10: 0
PAINLEVEL_OUTOF10: 9
PAINLEVEL_OUTOF10: 8
PAINLEVEL_OUTOF10: 8

## 2021-05-29 ASSESSMENT — PAIN DESCRIPTION - LOCATION
LOCATION: CHEST
LOCATION: CHEST

## 2021-05-29 ASSESSMENT — PAIN DESCRIPTION - DESCRIPTORS: DESCRIPTORS: ACHING;DISCOMFORT;DULL;BURNING

## 2021-05-29 ASSESSMENT — PAIN DESCRIPTION - FREQUENCY: FREQUENCY: CONTINUOUS

## 2021-05-29 ASSESSMENT — PAIN DESCRIPTION - PAIN TYPE
TYPE: NEUROPATHIC PAIN
TYPE: SURGICAL PAIN

## 2021-05-29 NOTE — PROGRESS NOTES
POD#1 Awake, alert. C/o chest tube pain. Denies CP, palpitations, SOB at rest, dizziness/lightheadedness. Vitals:    05/28/21 1840 05/28/21 1952 05/28/21 2352 05/29/21 0447   BP: 124/81  (!) 151/79 125/79   Pulse: 80  84 67   Resp: 16  14 14   Temp: 98.4 °F (36.9 °C)  97.9 °F (36.6 °C) 98 °F (36.7 °C)   TempSrc: Temporal  Oral Oral   SpO2: 99% 97% 99% 99%   Weight:       Height:         O2: none      Intake/Output Summary (Last 24 hours) at 5/29/2021 0641  Last data filed at 5/28/2021 2353  Gross per 24 hour   Intake 700 ml   Output 880 ml   Net -180 ml         UO: 300mL/8hr     CT:  Pleural: 50mL/8hr (220mL/24hrs)      Recent Labs     05/28/21  0940 05/29/21  0529   WBC 20.6* 16.5*   HGB 14.0 13.4   HCT 42.6 41.4    295      Recent Labs     05/28/21  0940 05/29/21  0529   BUN 22* 20   CREATININE 0.7 0.8         Telemetry: SR      PE  Cardiac: RRR  Lungs: decreased bases  Chest incision C/D/I, approximated, no erythema. Chest tube x 1 present and secure. Abd: Soft, nontender, +BS  Ext: JOHNSON          POD#1    A/P: Stable s/p Right robotic VATS/Robotic RUL wedge resection/Robotic RULobectomy/Mediastinal lymph node dissection/Intercostal nerve block on 5/28/21  Acute post operative anemia secondary to surgery  Maintaining NSR  VSS  CT without significant drainage. Positive air leak per [employer] chest drainage system ~170-500's. Continue chest tube until airleak resolves.   Tolerating RA  Post-op pain intolerance--increase oral narcotics, add lidocaine patches, continue toradol  Increase activity as tolerated  Encourage incentive spirometry   This patient's case and care plan was discussed with the attending surgeon

## 2021-05-29 NOTE — PROGRESS NOTES
Pulmonary Progress Note    Admit Date: 2021                            PCP: Rena Michaels MD  Active Problems:    Lung nodule  Resolved Problems:    * No resolved hospital problems. *      Subjective:  Now weaned off O2 and on room air. Having much incisional discomfort so difficulty taking deep breaths and clearing secretions. Medications:   ropivacaine 0.2%      sodium chloride          lidocaine  2 patch Transdermal Daily    sodium chloride flush  10 mL Intravenous 2 times per day    enoxaparin  40 mg Subcutaneous Daily    acetaminophen  650 mg Oral Q6H    sennosides-docusate sodium  1 tablet Oral BID    ipratropium-albuterol  1 ampule Inhalation Q4H WA    mupirocin   Topical BID    amiodarone  200 mg Oral BID    ketorolac  15 mg Intravenous Q6H       Vitals:  VITALS:  /77   Pulse 71   Temp 96.9 °F (36.1 °C) (Temporal)   Resp 16   Ht 6' 2\" (1.88 m)   Wt 196 lb (88.9 kg)   SpO2 92%   BMI 25.16 kg/m²   24HR INTAKE/OUTPUT:      Intake/Output Summary (Last 24 hours) at 2021 1100  Last data filed at 2021 0849  Gross per 24 hour   Intake 1020 ml   Output 1215 ml   Net -195 ml     CURRENT PULSE OXIMETRY:  SpO2: 92 %  24HR PULSE OXIMETRY RANGE:  SpO2  Av.2 %  Min: 92 %  Max: 100 %  CVP:    VENT SETTINGS:   Vent Information  FiO2 : 40 %  SpO2: 92 %  SpO2/FiO2 ratio: 250  Additional Respiratory  Assessments  Pulse: 71  Resp: 16  SpO2: 92 %  Oral Care: Mouthwash      EXAM:  General: No distress. Alert. ENT:  Pharynx clear. Neck: Trachea midline. Subcutaneous emphysema  Resp: No accessory muscle use. Rhonchi, clears with cough. Subcutaneous emphysema. CV: Regular rate. Regular rhythm. No mumur or rub. No edema. ABD: Non-tender. Non-distended. No masses. No organmegaly. Normal bowel sounds. Skin: Warm and dry. No rash on exposed extremities. Chest incision clear of s/s infection, CT x 1 secure. Neuro: Awake. Follows commands.      I/O: I/O last 3 completed shifts: In: 1180 [P.O.:480; I.V.:700]  Out: 1215 [Urine:960; Chest Tube:255]  I/O this shift:  In: 240 [P.O.:240]  Out: -      Results:  CBC:   Recent Labs     05/28/21  0940 05/29/21  0529   WBC 20.6* 16.5*   HGB 14.0 13.4   HCT 42.6 41.4   MCV 96.2 97.4    295     BMP:   Recent Labs     05/28/21  0940 05/29/21  0529    136   K 3.8 4.2    100   CO2 21* 25   BUN 22* 20   CREATININE 0.7 0.8     LFT: No results for input(s): ALKPHOS, ALT, AST, PROT, BILITOT, BILIDIR, LABALBU in the last 72 hours. PT/INR: No results for input(s): PROTIME, INR in the last 72 hours. Cultures:  No results for input(s): CULTRESP in the last 72 hours. ABG:   No results for input(s): PH, PO2, PCO2, HCO3, BE, O2SAT in the last 72 hours. Films:  No results found. Assessment:    Mr. Titus Abdul ss a 61 y/o male with past medical history noted for asthma/COPD, lung nodule that presented to [facility] for RUL wedge resection. Patient is known to myself and [facility].     CT Chest 9/10/2018 noted 4 mm RUL lung nodule  CT Chest 10/28/2020 noted 6 mm RUL lung nodule  CT Chest 2/18/2021 noted 7 mm RUL nodule with irregular margins  PET/CT 4/13/2021 noted interval SUV at cutoff 2.5 with no other disseminated metabolic process.     RUL wedge resection 5/28/2021 noted cells consistent with NSCLC with negative margins. Pulmonary has been consulted post-operatively. 1.) RUL Non-Small Cell Lung Carcinoma  - PET/CT completed 4/13/2021 that noted PET-avid lesion  - 5/28/2021 wedge resection positive for NSCLC with negative margins   - procedures completed 5/28/2021:  1.  Right robotic VATS. 2.  Robotic right upper lobe wedge resection. 3.  Robotic right upper lobectomy. 4.  Mediastinal lymph node dissection.   4.  Intercostal nerve block.     PLAN:  1.) s/p RUL wedge resection with negative margins with tissue positive for non small cell lung carcinoma   2.) chest tube per CT surgery   3.) O2, IS, pain control   4.) await D/C planning   5.) DVT Prophylaxis     Per Hem/Onc: appears to be an early stage I Lung ca and would not require adjuvant systemic chemotherapy, but will await pathology to confirm plan. Recommend surveillance imaging. Weaned off O2, still with congested cough, having difficulty clearing due to pain. Encouraged to use IS for few hours post medication. Continue duoneb aerosols. Currently with air leak so will keep ct. Being managed by CT surgery. Electronically signed by EDWAR Pelaez CNP on 5/29/2021 at 11:00 AM     Seen and evaluated, agree with above assessment and plan.

## 2021-05-30 LAB
ANION GAP SERPL CALCULATED.3IONS-SCNC: 9 MMOL/L (ref 7–16)
BUN BLDV-MCNC: 20 MG/DL (ref 6–20)
CALCIUM SERPL-MCNC: 8.7 MG/DL (ref 8.6–10.2)
CHLORIDE BLD-SCNC: 100 MMOL/L (ref 98–107)
CO2: 28 MMOL/L (ref 22–29)
CREAT SERPL-MCNC: 0.8 MG/DL (ref 0.7–1.2)
GFR AFRICAN AMERICAN: >60
GFR NON-AFRICAN AMERICAN: >60 ML/MIN/1.73
GLUCOSE BLD-MCNC: 121 MG/DL (ref 74–99)
HCT VFR BLD CALC: 38.9 % (ref 37–54)
HEMOGLOBIN: 12.5 G/DL (ref 12.5–16.5)
MCH RBC QN AUTO: 31 PG (ref 26–35)
MCHC RBC AUTO-ENTMCNC: 32.1 % (ref 32–34.5)
MCV RBC AUTO: 96.5 FL (ref 80–99.9)
PDW BLD-RTO: 14.3 FL (ref 11.5–15)
PLATELET # BLD: 273 E9/L (ref 130–450)
PMV BLD AUTO: 9.6 FL (ref 7–12)
POTASSIUM SERPL-SCNC: 4 MMOL/L (ref 3.5–5)
RBC # BLD: 4.03 E12/L (ref 3.8–5.8)
SODIUM BLD-SCNC: 137 MMOL/L (ref 132–146)
WBC # BLD: 13.3 E9/L (ref 4.5–11.5)

## 2021-05-30 PROCEDURE — 6370000000 HC RX 637 (ALT 250 FOR IP): Performed by: NURSE PRACTITIONER

## 2021-05-30 PROCEDURE — 6360000002 HC RX W HCPCS: Performed by: PHYSICIAN ASSISTANT

## 2021-05-30 PROCEDURE — 6360000002 HC RX W HCPCS: Performed by: THORACIC SURGERY (CARDIOTHORACIC VASCULAR SURGERY)

## 2021-05-30 PROCEDURE — 32560 TREAT PLEURODESIS W/AGENT: CPT | Performed by: THORACIC SURGERY (CARDIOTHORACIC VASCULAR SURGERY)

## 2021-05-30 PROCEDURE — 85027 COMPLETE CBC AUTOMATED: CPT

## 2021-05-30 PROCEDURE — 36415 COLL VENOUS BLD VENIPUNCTURE: CPT

## 2021-05-30 PROCEDURE — 2500000003 HC RX 250 WO HCPCS: Performed by: NURSE PRACTITIONER

## 2021-05-30 PROCEDURE — 2700000000 HC OXYGEN THERAPY PER DAY

## 2021-05-30 PROCEDURE — 2140000000 HC CCU INTERMEDIATE R&B

## 2021-05-30 PROCEDURE — 94640 AIRWAY INHALATION TREATMENT: CPT

## 2021-05-30 PROCEDURE — 2580000003 HC RX 258: Performed by: NURSE PRACTITIONER

## 2021-05-30 PROCEDURE — 80048 BASIC METABOLIC PNL TOTAL CA: CPT

## 2021-05-30 PROCEDURE — 6360000002 HC RX W HCPCS: Performed by: NURSE PRACTITIONER

## 2021-05-30 RX ORDER — MORPHINE SULFATE 2 MG/ML
2 INJECTION, SOLUTION INTRAMUSCULAR; INTRAVENOUS
Status: DISCONTINUED | OUTPATIENT
Start: 2021-05-30 | End: 2021-06-01 | Stop reason: HOSPADM

## 2021-05-30 RX ORDER — LIDOCAINE HYDROCHLORIDE 10 MG/ML
10 INJECTION, SOLUTION INFILTRATION; PERINEURAL ONCE
Status: COMPLETED | OUTPATIENT
Start: 2021-05-30 | End: 2021-05-30

## 2021-05-30 RX ORDER — FENTANYL CITRATE 50 UG/ML
25 INJECTION, SOLUTION INTRAMUSCULAR; INTRAVENOUS
Status: COMPLETED | OUTPATIENT
Start: 2021-05-30 | End: 2021-05-30

## 2021-05-30 RX ADMIN — SODIUM CHLORIDE, PRESERVATIVE FREE 10 ML: 5 INJECTION INTRAVENOUS at 07:56

## 2021-05-30 RX ADMIN — OXYCODONE HYDROCHLORIDE 10 MG: 10 TABLET ORAL at 00:41

## 2021-05-30 RX ADMIN — KETOROLAC TROMETHAMINE 15 MG: 30 INJECTION, SOLUTION INTRAMUSCULAR; INTRAVENOUS at 07:59

## 2021-05-30 RX ADMIN — IPRATROPIUM BROMIDE AND ALBUTEROL SULFATE 1 AMPULE: .5; 3 SOLUTION RESPIRATORY (INHALATION) at 21:49

## 2021-05-30 RX ADMIN — LIDOCAINE HYDROCHLORIDE 10 ML: 10 INJECTION, SOLUTION INFILTRATION; PERINEURAL at 07:56

## 2021-05-30 RX ADMIN — OXYCODONE HYDROCHLORIDE 10 MG: 10 TABLET ORAL at 19:11

## 2021-05-30 RX ADMIN — BISACODYL 10 MG: 5 TABLET, COATED ORAL at 12:24

## 2021-05-30 RX ADMIN — OXYCODONE HYDROCHLORIDE 10 MG: 10 TABLET ORAL at 12:24

## 2021-05-30 RX ADMIN — AMIODARONE HYDROCHLORIDE 200 MG: 200 TABLET ORAL at 20:26

## 2021-05-30 RX ADMIN — AMIODARONE HYDROCHLORIDE 200 MG: 200 TABLET ORAL at 07:55

## 2021-05-30 RX ADMIN — SENNOSIDES AND DOCUSATE SODIUM 1 TABLET: 8.6; 5 TABLET ORAL at 20:26

## 2021-05-30 RX ADMIN — TALC 2.5 G: 2 POWDER INTRAPLEURAL at 10:03

## 2021-05-30 RX ADMIN — SODIUM CHLORIDE, PRESERVATIVE FREE 10 ML: 5 INJECTION INTRAVENOUS at 20:26

## 2021-05-30 RX ADMIN — IPRATROPIUM BROMIDE AND ALBUTEROL SULFATE 1 AMPULE: .5; 3 SOLUTION RESPIRATORY (INHALATION) at 16:11

## 2021-05-30 RX ADMIN — MUPIROCIN: 20 OINTMENT TOPICAL at 20:28

## 2021-05-30 RX ADMIN — MAGNESIUM HYDROXIDE 30 ML: 2400 SUSPENSION ORAL at 19:12

## 2021-05-30 RX ADMIN — ENOXAPARIN SODIUM 40 MG: 40 INJECTION SUBCUTANEOUS at 07:56

## 2021-05-30 RX ADMIN — FENTANYL CITRATE 25 MCG: 50 INJECTION, SOLUTION INTRAMUSCULAR; INTRAVENOUS at 09:56

## 2021-05-30 RX ADMIN — SODIUM CHLORIDE, PRESERVATIVE FREE 10 ML: 5 INJECTION INTRAVENOUS at 22:09

## 2021-05-30 RX ADMIN — MUPIROCIN: 20 OINTMENT TOPICAL at 07:56

## 2021-05-30 RX ADMIN — OXYCODONE HYDROCHLORIDE 10 MG: 10 TABLET ORAL at 04:43

## 2021-05-30 RX ADMIN — MORPHINE SULFATE 2 MG: 2 INJECTION, SOLUTION INTRAMUSCULAR; INTRAVENOUS at 22:09

## 2021-05-30 RX ADMIN — SENNOSIDES AND DOCUSATE SODIUM 1 TABLET: 8.6; 5 TABLET ORAL at 07:56

## 2021-05-30 RX ADMIN — OXYCODONE HYDROCHLORIDE 10 MG: 10 TABLET ORAL at 23:30

## 2021-05-30 RX ADMIN — BISACODYL 10 MG: 10 SUPPOSITORY RECTAL at 18:24

## 2021-05-30 ASSESSMENT — PAIN SCALES - GENERAL
PAINLEVEL_OUTOF10: 2
PAINLEVEL_OUTOF10: 6
PAINLEVEL_OUTOF10: 8
PAINLEVEL_OUTOF10: 0
PAINLEVEL_OUTOF10: 6
PAINLEVEL_OUTOF10: 0
PAINLEVEL_OUTOF10: 8
PAINLEVEL_OUTOF10: 0
PAINLEVEL_OUTOF10: 4
PAINLEVEL_OUTOF10: 0
PAINLEVEL_OUTOF10: 10

## 2021-05-30 ASSESSMENT — PAIN DESCRIPTION - LOCATION
LOCATION: INCISION

## 2021-05-30 ASSESSMENT — PAIN DESCRIPTION - ORIENTATION
ORIENTATION: RIGHT

## 2021-05-30 ASSESSMENT — PAIN DESCRIPTION - PAIN TYPE
TYPE: SURGICAL PAIN

## 2021-05-30 ASSESSMENT — PAIN - FUNCTIONAL ASSESSMENT: PAIN_FUNCTIONAL_ASSESSMENT: ACTIVITIES ARE NOT PREVENTED

## 2021-05-30 ASSESSMENT — PAIN DESCRIPTION - DESCRIPTORS
DESCRIPTORS: ACHING;DISCOMFORT
DESCRIPTORS: ACHING;DISCOMFORT;PRESSURE

## 2021-05-30 ASSESSMENT — PAIN DESCRIPTION - FREQUENCY: FREQUENCY: CONTINUOUS

## 2021-05-30 ASSESSMENT — PAIN DESCRIPTION - PROGRESSION: CLINICAL_PROGRESSION: GRADUALLY WORSENING

## 2021-05-30 NOTE — PROCEDURES
Patient with persistent air leak post VATS. Right chest tube prepped and drapped. 10ml of 1% lidocaine injected into the tube and dwelled prior to injecting the talc slurry. 5gm in 100cc in 0.9% sodium chloride talc slurry injected into the tube. Will keep chest tube clamped for 2 hours and alternate left side down and right side down Q30 minutes. Continuous pulse oximeter to remain in place during these 2 hours. Unclamp tube after two hours complete and place to -20cm suction per the chest drainage system. Patient tolerated well.

## 2021-05-30 NOTE — PROGRESS NOTES
POD#2 Awake, alert. No complaints other than subq air and constipation. Denies CP, palpitations, SOB at rest, dizziness/lightheadedness. Vitals:    05/29/21 2308 05/30/21 0300 05/30/21 0615 05/30/21 0720   BP: 116/80 128/75  135/79   Pulse: 88 78  92   Resp: 18 18  18   Temp: 98.3 °F (36.8 °C) 98.8 °F (37.1 °C)  98.4 °F (36.9 °C)   TempSrc: Oral Temporal  Temporal   SpO2: 93% 91%  94%   Weight:   191 lb 9.6 oz (86.9 kg)    Height:         O2: none      Intake/Output Summary (Last 24 hours) at 5/30/2021 0801  Last data filed at 5/30/2021 1038  Gross per 24 hour   Intake 1260 ml   Output 635 ml   Net 625 ml         UO: 525mL/8hr   CT output:  Pleural: 75mL/8hr (110mL/24hrs)      Recent Labs     05/28/21  0940 05/29/21  0529 05/30/21  0648   WBC 20.6* 16.5* 13.3*   HGB 14.0 13.4 12.5   HCT 42.6 41.4 38.9    295 273      Recent Labs     05/28/21  0940 05/29/21  0529 05/30/21  0648   BUN 22* 20 20   CREATININE 0.7 0.8 0.8       Telemetry: SR        PE  Cardiac: RRR  Lungs: decreased bases  Chest incision C/D/I, approximated, no erythema. Chest tube x 1 present and secure. +subq to chest wall, neck, face  Abd: Soft, nontender, +BS  Ext: JOHNSON           POD#2     A/P: Stable s/p Right robotic VATS/Robotic RUL wedge resection/Robotic RULobectomy/Mediastinal lymph node dissection/Intercostal nerve block on 5/28/21  Acute post operative anemia secondary to surgery  Maintaining NSR  VSS  CT without significant drainage. Positive air leak per [employer] chest drainage system ~400's. Continue chest tube until airleak resolves. --will do bedside talc pleurodesis today  Tolerating RA  Post-op pain intolerance--improved with increase in oral narcotics and addition of lidocaine patches yesterday, continue toradol  Constipation--continue prn MOM and suppository--continue senna-s, add oral bisacodyl  Increase activity as tolerated  Encourage incentive spirometry   This patient's case and care plan was discussed with the attending surgeon

## 2021-05-30 NOTE — PROGRESS NOTES
Message sent to Dr. Lety Rivas via perfect serve regarding patient developing neck and facial edema with subq positive upper chest/ collar bone area bilaterally. Patient denies difficulty swallowing. States face and neck feel \"tight\". No new orders at this time.

## 2021-05-30 NOTE — PROGRESS NOTES
Pulmonary Progress Note    Admit Date: 2021                            PCP: Jen Bean MD  Active Problems:    Lung nodule  Resolved Problems:    * No resolved hospital problems. *      Subjective:  Noticed to have persistent air leak around 400 cc. CT surgery decided to proceed with pleurodesis with a right hemithoracic cavity. Required 2 L of O2    Medications:   ropivacaine 0.2%      sodium chloride          bisacodyl  10 mg Oral Daily    lidocaine  2 patch Transdermal Daily    sodium chloride flush  10 mL Intravenous 2 times per day    enoxaparin  40 mg Subcutaneous Daily    acetaminophen  650 mg Oral Q6H    sennosides-docusate sodium  1 tablet Oral BID    ipratropium-albuterol  1 ampule Inhalation Q4H WA    mupirocin   Topical BID    amiodarone  200 mg Oral BID       Vitals:  VITALS:  /63   Pulse 82   Temp 97.1 °F (36.2 °C) (Temporal)   Resp 18   Ht 6' 2\" (1.88 m)   Wt 191 lb 9.6 oz (86.9 kg)   SpO2 94%   BMI 24.60 kg/m²   24HR INTAKE/OUTPUT:      Intake/Output Summary (Last 24 hours) at 2021 1443  Last data filed at 2021 1416  Gross per 24 hour   Intake 540 ml   Output 1275 ml   Net -735 ml     CURRENT PULSE OXIMETRY:  SpO2: 94 %  24HR PULSE OXIMETRY RANGE:  SpO2  Av.8 %  Min: 91 %  Max: 99 %  CVP:    VENT SETTINGS:   Vent Information  FiO2 : 40 %  SpO2: 94 %  SpO2/FiO2 ratio: 250  Additional Respiratory  Assessments  Pulse: 82  Resp: 18  SpO2: 94 %  Oral Care: Mouthwash      EXAM:  General: No distress. Alert. ENT:  Pharynx clear. Neck: Trachea midline. Subcutaneous emphysema  Resp: No accessory muscle use. Rhonchi, clears with cough. Subcutaneous emphysema. Decreased breath sounds right base. air leak down to 100 cc  CV: Regular rate. Regular rhythm. No mumur or rub. No edema. ABD: Non-tender. Non-distended. No masses. No organmegaly. Normal bowel sounds. Skin: Warm and dry. No rash on exposed extremities.  Chest incision clear of s/s infection, CT x 1 secure. Neuro: Awake. Follows commands. I/O: I/O last 3 completed shifts: In: 1260 [P.O.:1260]  Out: 635 [Urine:525; Chest Tube:110]  I/O this shift:  In: -   Out: 675 [Urine:550; Chest Tube:125]     Results:  CBC:   Recent Labs     05/28/21  0940 05/29/21  0529 05/30/21  0648   WBC 20.6* 16.5* 13.3*   HGB 14.0 13.4 12.5   HCT 42.6 41.4 38.9   MCV 96.2 97.4 96.5    295 273     BMP:   Recent Labs     05/28/21  0940 05/29/21  0529 05/30/21  0648    136 137   K 3.8 4.2 4.0    100 100   CO2 21* 25 28   BUN 22* 20 20   CREATININE 0.7 0.8 0.8     LFT: No results for input(s): ALKPHOS, ALT, AST, PROT, BILITOT, BILIDIR, LABALBU in the last 72 hours. PT/INR: No results for input(s): PROTIME, INR in the last 72 hours. Cultures:  No results for input(s): CULTRESP in the last 72 hours. ABG:   No results for input(s): PH, PO2, PCO2, HCO3, BE, O2SAT in the last 72 hours. Films:  No results found. Assessment:    Mr. Jerod Arreguin ss a 61 y/o male with past medical history noted for asthma/COPD, lung nodule that presented to [facility] for RUL wedge resection. Patient is known to myself and EO.     CT Chest 9/10/2018 noted 4 mm RUL lung nodule  CT Chest 10/28/2020 noted 6 mm RUL lung nodule  CT Chest 2/18/2021 noted 7 mm RUL nodule with irregular margins  PET/CT 4/13/2021 noted interval SUV at cutoff 2.5 with no other disseminated metabolic process.     RUL wedge resection 5/28/2021 noted cells consistent with NSCLC with negative margins. Pulmonary has been consulted post-operatively. 1.) RUL Non-Small Cell Lung Carcinoma  - PET/CT completed 4/13/2021 that noted PET-avid lesion  - 5/28/2021 wedge resection positive for NSCLC with negative margins   - procedures completed 5/28/2021:  1.  Right robotic VATS. 2.  Robotic right upper lobe wedge resection. 3.  Robotic right upper lobectomy. 4.  Mediastinal lymph node dissection.   4.  Intercostal nerve block.     PLAN:  1.) s/p RUL wedge

## 2021-05-31 LAB
ANION GAP SERPL CALCULATED.3IONS-SCNC: 12 MMOL/L (ref 7–16)
BUN BLDV-MCNC: 15 MG/DL (ref 6–20)
CALCIUM SERPL-MCNC: 9.2 MG/DL (ref 8.6–10.2)
CHLORIDE BLD-SCNC: 98 MMOL/L (ref 98–107)
CO2: 27 MMOL/L (ref 22–29)
CREAT SERPL-MCNC: 0.7 MG/DL (ref 0.7–1.2)
GFR AFRICAN AMERICAN: >60
GFR NON-AFRICAN AMERICAN: >60 ML/MIN/1.73
GLUCOSE BLD-MCNC: 122 MG/DL (ref 74–99)
HCT VFR BLD CALC: 41.9 % (ref 37–54)
HEMOGLOBIN: 13.7 G/DL (ref 12.5–16.5)
MCH RBC QN AUTO: 31.6 PG (ref 26–35)
MCHC RBC AUTO-ENTMCNC: 32.7 % (ref 32–34.5)
MCV RBC AUTO: 96.5 FL (ref 80–99.9)
PDW BLD-RTO: 13.7 FL (ref 11.5–15)
PLATELET # BLD: 293 E9/L (ref 130–450)
PMV BLD AUTO: 9.4 FL (ref 7–12)
POTASSIUM SERPL-SCNC: 4.5 MMOL/L (ref 3.5–5)
RBC # BLD: 4.34 E12/L (ref 3.8–5.8)
SODIUM BLD-SCNC: 137 MMOL/L (ref 132–146)
WBC # BLD: 13.9 E9/L (ref 4.5–11.5)

## 2021-05-31 PROCEDURE — 94640 AIRWAY INHALATION TREATMENT: CPT

## 2021-05-31 PROCEDURE — 85027 COMPLETE CBC AUTOMATED: CPT

## 2021-05-31 PROCEDURE — 6360000002 HC RX W HCPCS: Performed by: THORACIC SURGERY (CARDIOTHORACIC VASCULAR SURGERY)

## 2021-05-31 PROCEDURE — 2580000003 HC RX 258: Performed by: NURSE PRACTITIONER

## 2021-05-31 PROCEDURE — 2140000000 HC CCU INTERMEDIATE R&B

## 2021-05-31 PROCEDURE — 6370000000 HC RX 637 (ALT 250 FOR IP): Performed by: INTERNAL MEDICINE

## 2021-05-31 PROCEDURE — 6360000002 HC RX W HCPCS: Performed by: NURSE PRACTITIONER

## 2021-05-31 PROCEDURE — 36415 COLL VENOUS BLD VENIPUNCTURE: CPT

## 2021-05-31 PROCEDURE — 2700000000 HC OXYGEN THERAPY PER DAY

## 2021-05-31 PROCEDURE — 6370000000 HC RX 637 (ALT 250 FOR IP): Performed by: NURSE PRACTITIONER

## 2021-05-31 PROCEDURE — 80048 BASIC METABOLIC PNL TOTAL CA: CPT

## 2021-05-31 RX ORDER — SODIUM PHOSPHATE, DIBASIC AND SODIUM PHOSPHATE, MONOBASIC 7; 19 G/133ML; G/133ML
1 ENEMA RECTAL
Status: COMPLETED | OUTPATIENT
Start: 2021-05-31 | End: 2021-05-31

## 2021-05-31 RX ORDER — SENNA AND DOCUSATE SODIUM 50; 8.6 MG/1; MG/1
2 TABLET, FILM COATED ORAL DAILY PRN
Status: DISCONTINUED | OUTPATIENT
Start: 2021-05-31 | End: 2021-06-01 | Stop reason: HOSPADM

## 2021-05-31 RX ADMIN — IPRATROPIUM BROMIDE AND ALBUTEROL SULFATE 1 AMPULE: .5; 3 SOLUTION RESPIRATORY (INHALATION) at 11:36

## 2021-05-31 RX ADMIN — OXYCODONE HYDROCHLORIDE 10 MG: 10 TABLET ORAL at 08:02

## 2021-05-31 RX ADMIN — SENNOSIDES AND DOCUSATE SODIUM 2 TABLET: 8.6; 5 TABLET ORAL at 20:51

## 2021-05-31 RX ADMIN — OXYCODONE HYDROCHLORIDE 10 MG: 10 TABLET ORAL at 12:38

## 2021-05-31 RX ADMIN — SENNOSIDES AND DOCUSATE SODIUM 1 TABLET: 8.6; 5 TABLET ORAL at 20:56

## 2021-05-31 RX ADMIN — MORPHINE SULFATE 2 MG: 2 INJECTION, SOLUTION INTRAMUSCULAR; INTRAVENOUS at 23:19

## 2021-05-31 RX ADMIN — SENNOSIDES AND DOCUSATE SODIUM 1 TABLET: 8.6; 5 TABLET ORAL at 08:03

## 2021-05-31 RX ADMIN — AMIODARONE HYDROCHLORIDE 200 MG: 200 TABLET ORAL at 08:03

## 2021-05-31 RX ADMIN — OXYCODONE HYDROCHLORIDE 10 MG: 10 TABLET ORAL at 03:31

## 2021-05-31 RX ADMIN — IPRATROPIUM BROMIDE AND ALBUTEROL SULFATE 1 AMPULE: .5; 3 SOLUTION RESPIRATORY (INHALATION) at 16:27

## 2021-05-31 RX ADMIN — IPRATROPIUM BROMIDE AND ALBUTEROL SULFATE 1 AMPULE: .5; 3 SOLUTION RESPIRATORY (INHALATION) at 20:29

## 2021-05-31 RX ADMIN — OXYCODONE HYDROCHLORIDE 10 MG: 10 TABLET ORAL at 21:32

## 2021-05-31 RX ADMIN — MUPIROCIN: 20 OINTMENT TOPICAL at 08:05

## 2021-05-31 RX ADMIN — AMIODARONE HYDROCHLORIDE 200 MG: 200 TABLET ORAL at 20:51

## 2021-05-31 RX ADMIN — SODIUM PHOSPHATE 1 ENEMA: 7; 19 ENEMA RECTAL at 10:35

## 2021-05-31 RX ADMIN — SODIUM CHLORIDE, PRESERVATIVE FREE 10 ML: 5 INJECTION INTRAVENOUS at 20:52

## 2021-05-31 RX ADMIN — OXYCODONE HYDROCHLORIDE 10 MG: 10 TABLET ORAL at 17:31

## 2021-05-31 RX ADMIN — BISACODYL 10 MG: 5 TABLET, COATED ORAL at 08:03

## 2021-05-31 RX ADMIN — MORPHINE SULFATE 2 MG: 2 INJECTION, SOLUTION INTRAMUSCULAR; INTRAVENOUS at 09:22

## 2021-05-31 RX ADMIN — ENOXAPARIN SODIUM 40 MG: 40 INJECTION SUBCUTANEOUS at 08:08

## 2021-05-31 RX ADMIN — IPRATROPIUM BROMIDE AND ALBUTEROL SULFATE 1 AMPULE: .5; 3 SOLUTION RESPIRATORY (INHALATION) at 08:33

## 2021-05-31 RX ADMIN — SODIUM CHLORIDE, PRESERVATIVE FREE 10 ML: 5 INJECTION INTRAVENOUS at 08:08

## 2021-05-31 ASSESSMENT — PAIN SCALES - GENERAL
PAINLEVEL_OUTOF10: 6
PAINLEVEL_OUTOF10: 8
PAINLEVEL_OUTOF10: 10
PAINLEVEL_OUTOF10: 4
PAINLEVEL_OUTOF10: 10
PAINLEVEL_OUTOF10: 8
PAINLEVEL_OUTOF10: 10
PAINLEVEL_OUTOF10: 10
PAINLEVEL_OUTOF10: 8

## 2021-05-31 ASSESSMENT — PAIN DESCRIPTION - ORIENTATION
ORIENTATION: RIGHT
ORIENTATION: RIGHT

## 2021-05-31 ASSESSMENT — PAIN DESCRIPTION - LOCATION: LOCATION: BACK;CHEST

## 2021-05-31 ASSESSMENT — PAIN DESCRIPTION - PAIN TYPE
TYPE: SURGICAL PAIN
TYPE: SURGICAL PAIN

## 2021-05-31 NOTE — PROGRESS NOTES
Pulmonary Progress Note  5/31/2021 10:59 AM  Subjective:   Admit Date: 5/28/2021  PCP: Alyce Hudson MD  Interval History: Complaining of pain on the right asking for more pain medications he is already on OxyContin and fentanyl patch. Also having constipation from the pain medications. Diet: Dietary Nutrition Supplements: Diabetic Oral Supplement  DIET GENERAL;  SOB is: None  Cough: None  Wheezing: None  chest pain: Moderate    Data:   Scheduled Meds:    bisacodyl  10 mg Oral Daily    lidocaine  2 patch Transdermal Daily    sodium chloride flush  10 mL Intravenous 2 times per day    enoxaparin  40 mg Subcutaneous Daily    sennosides-docusate sodium  1 tablet Oral BID    ipratropium-albuterol  1 ampule Inhalation Q4H WA    amiodarone  200 mg Oral BID       Continuous Infusions:    ropivacaine 0.2%      sodium chloride         PRN Meds: morphine, oxyCODONE **OR** oxyCODONE, sodium chloride flush, sodium chloride, ondansetron, magnesium hydroxide, bisacodyl, magnesium sulfate    I/O last 3 completed shifts: In: 360 [P.O.:360]  Out: 1853 [Urine:1225; Chest Tube:260]    No intake/output data recorded. Intake/Output Summary (Last 24 hours) at 5/31/2021 1059  Last data filed at 5/31/2021 0655  Gross per 24 hour   Intake 360 ml   Output 1285 ml   Net -925 ml       Patient Vitals for the past 96 hrs (Last 3 readings):   Weight   05/31/21 0445 189 lb 6.4 oz (85.9 kg)   05/30/21 0615 191 lb 9.6 oz (86.9 kg)   05/28/21 0541 196 lb (88.9 kg)         Recent Labs     05/29/21  0529 05/30/21  0648 05/31/21  0557   WBC 16.5* 13.3* 13.9*   HGB 13.4 12.5 13.7   HCT 41.4 38.9 41.9   MCV 97.4 96.5 96.5    273 293     Recent Labs     05/29/21  0529 05/30/21  0648 05/31/21  0557    137 137   K 4.2 4.0 4.5    100 98   CO2 25 28 27   BUN 20 20 15   CREATININE 0.8 0.8 0.7     No results for input(s): PROT, ALB, ALKPHOS, ALT, AST, BILITOT, AMYLASE, LIPASE in the last 72 hours.   No results for input(s): INR, APTT in the last 72 hours. No results for input(s): CKTOTAL, CKMB, TROPONINI in the last 72 hours. No results for input(s): BNP in the last 72 hours. Troponin: No results for input(s): TROPONINI in the last 72 hours. CPK:No results found for: CKTOTAL   BNP: No results for input(s): BNP in the last 72 hours. ABGs: No results found for: PHART, PO2ART, ODE8XHV  INR: No results for input(s): INR in the last 72 hours. -----------------------------------------------------------------  RAD:   Results for orders placed during the hospital encounter of 05/28/21    XR CHEST PORTABLE    Narrative  EXAMINATION:  ONE XRAY VIEW OF THE CHEST    5/28/2021 10:10 am    COMPARISON:  CT of the chest, 02/28/2021. HISTORY:  ORDERING SYSTEM PROVIDED HISTORY: evaluate lung fields, tube placement  TECHNOLOGIST PROVIDED HISTORY:  Reason for exam:->evaluate lung fields, tube placement  What reading provider will be dictating this exam?->CRC    FINDINGS:  There is a right hilar lobular opacity, the etiology of which is unclear. No  mass or lymphadenopathy was seen in this region on the previous CT from  02/18/2021. A right chest tube is present. There is right-sided pneumomediastinum. Subcutaneous emphysema is seen along the right chest wall. No definite  pneumothorax is seen. The lungs are grossly clear. Impression  1. Right-sided pneumomediastinum and right chest wall subcutaneous emphysema. No significant pneumothorax seen. Right chest tube in situ. 2. Lobular opacity in the right hilar region, the etiology of which is  unclear. No mass or lymphadenopathy was seen in this region on the previous  CT. If indicated, CT may be obtained for further evaluation. Micro:  No results for input(s): BC in the last 72 hours.   Vent Information  FiO2 : 40 %  SpO2: 95 %  SpO2/FiO2 ratio: 250    Additional Respiratory  Assessments  Pulse: 84  Resp: 18  SpO2: 95 %  Oral Care: Mouthwash    Objective:   Vitals: Vitals:    21 0834   BP:    Pulse:    Resp:    Temp:    SpO2: 95%      TEMP:Current: Temp: 97.6 °F (36.4 °C)  Max: Temp  Av.1 °F (36.7 °C)  Min: 97.1 °F (36.2 °C)  Max: 98.6 °F (37 °C)    BP Range: Systolic (73FEU), JHH:501 , Min:110 , HBD:866     Diastolic (70UDV), THX:33, Min:63, Max:88    On 2 L  General appearance: alert, appears stated age and cooperative  In no acute distress on room air  Skin: No rashes or lesions  HEENT: mucous membranes are moist  Neck: No JVD  Lungs: symmetrical expansion, clear to auscultation, no use of accessory muscles right chest tube in place on suction  Heart: S1S2 no murmurs,  Abdomen: soft, non tender,   Extremities: no peripheral edema  Neurologic: Alert, oriented times 3,  Affect: pleasant      Assessment:   Patient Active Problem List:    Mr. Lillian Chambers a 59 y/o male with past medical history noted for asthma/COPD, lung nodule that presented to [facility] for RUL wedge resection.      CT Chest 9/10/2018 noted 4 mm RUL lung nodule  CT Chest 10/28/2020 noted 6 mm RUL lung nodule  CT Chest 2021 noted 7 mm RUL nodule with irregular margins  PET/CT 2021 noted interval SUV at cutoff 2.5 with no other disseminated metabolic process.     :2.  Right robotic VATS. NSCLC with negative margins.  Chest x-ray right pneumo mediastinum chest wall emphysema  2.  Robotic right upper lobe wedge resection. 3.  Robotic right upper lobectomy. 4.  Mediastinal lymph node dissection. 4.  Intercostal nerve block   2021:Pulmonary has been consulted post-operatively.  underwent right chemical pleurodesis for persistent pneumothorax    1. S/p RU lobectomy  non-small cell lung cancer for RUL nodule PET positive  2. Pre op spirometry mild obstructive FEV1 2.89 L 69% predicted DLCO 48%  3. Anxiety  4. History of sinus surgery    Plan:   1. Pain control  2.  Still with air leak and on suction    Reed Jay MD,[facility]

## 2021-05-31 NOTE — PLAN OF CARE
Problem: Pain:  Goal: Control of acute pain  Description: Control of acute pain  Outcome: Met This Shift     Problem: Falls - Risk of:  Goal: Absence of physical injury  Description: Absence of physical injury  5/30/2021 2120 by Brody Desai RN  Outcome: Met This Shift

## 2021-05-31 NOTE — PROGRESS NOTES
POD#3 Awake, alert. C/o chest tube pain. Denies CP, palpitations, SOB at rest, dizziness/lightheadedness. Vitals:    05/31/21 0300 05/31/21 0445 05/31/21 0710 05/31/21 0834   BP: 121/76  124/78    Pulse: 94  84    Resp: 20  18    Temp: 98.4 °F (36.9 °C)  97.6 °F (36.4 °C)    TempSrc: Temporal  Temporal    SpO2: 94%  95% 95%   Weight:  189 lb 6.4 oz (85.9 kg)     Height:         O2: none      Intake/Output Summary (Last 24 hours) at 5/31/2021 0910  Last data filed at 5/31/2021 0655  Gross per 24 hour   Intake 360 ml   Output 1485 ml   Net -1125 ml         UO: 375mL/8hr   CT output:   Pleural: 55mL/8hr (260mL/24hrs)      Recent Labs     05/29/21  0529 05/30/21  0648 05/31/21  0557   WBC 16.5* 13.3* 13.9*   HGB 13.4 12.5 13.7   HCT 41.4 38.9 41.9    273 293      Recent Labs     05/29/21  0529 05/30/21  0648 05/31/21  0557   BUN 20 20 15   CREATININE 0.8 0.8 0.7     Telemetry: SR        PE  Cardiac: RRR  Lungs: decreased bases  Chest incision C/D/I, approximated, no erythema. Chest tube x 1 present and secure. +subq to chest wall, neck, face  Abd: Soft, nontender, +BS  Ext: JOHNSON           POD#3     A/P: Stable s/p Right robotic VATS/Robotic RUL wedge resection/Robotic RULobectomy/Mediastinal lymph node dissection/Intercostal nerve block on 5/28/21  S/P bedside talc pleurodesis 5/30  Acute post operative anemia secondary to surgery  Maintaining NSR  VSS  CT without significant drainage. Positive air leak per [employer] chest drainage system ~60's. Continue chest tube until airleak resolves. --hopefully tomorrow  Tolerating RA  Post-op pain intolerance--continue current regimen with morphine for breakthrough  Constipation--continue prn MOM and suppository--continue senna-s and oral bisacodyl--add fleets enema today per patient request  Increase activity as tolerated  Encourage incentive spirometry   This patient's case and care plan was discussed with the attending surgeon

## 2021-06-01 VITALS
HEART RATE: 98 BPM | BODY MASS INDEX: 23.81 KG/M2 | DIASTOLIC BLOOD PRESSURE: 84 MMHG | WEIGHT: 185.5 LBS | OXYGEN SATURATION: 94 % | HEIGHT: 74 IN | RESPIRATION RATE: 22 BRPM | TEMPERATURE: 98.4 F | SYSTOLIC BLOOD PRESSURE: 123 MMHG

## 2021-06-01 LAB
ANION GAP SERPL CALCULATED.3IONS-SCNC: 13 MMOL/L (ref 7–16)
BUN BLDV-MCNC: 16 MG/DL (ref 6–20)
CALCIUM SERPL-MCNC: 9.2 MG/DL (ref 8.6–10.2)
CHLORIDE BLD-SCNC: 96 MMOL/L (ref 98–107)
CO2: 26 MMOL/L (ref 22–29)
CREAT SERPL-MCNC: 0.7 MG/DL (ref 0.7–1.2)
GFR AFRICAN AMERICAN: >60
GFR NON-AFRICAN AMERICAN: >60 ML/MIN/1.73
GLUCOSE BLD-MCNC: 114 MG/DL (ref 74–99)
HCT VFR BLD CALC: 39.6 % (ref 37–54)
HEMOGLOBIN: 13.1 G/DL (ref 12.5–16.5)
MCH RBC QN AUTO: 31.3 PG (ref 26–35)
MCHC RBC AUTO-ENTMCNC: 33.1 % (ref 32–34.5)
MCV RBC AUTO: 94.7 FL (ref 80–99.9)
PDW BLD-RTO: 13.5 FL (ref 11.5–15)
PLATELET # BLD: 314 E9/L (ref 130–450)
PMV BLD AUTO: 9.6 FL (ref 7–12)
POTASSIUM SERPL-SCNC: 4.3 MMOL/L (ref 3.5–5)
RBC # BLD: 4.18 E12/L (ref 3.8–5.8)
SODIUM BLD-SCNC: 135 MMOL/L (ref 132–146)
WBC # BLD: 12.5 E9/L (ref 4.5–11.5)

## 2021-06-01 PROCEDURE — 80048 BASIC METABOLIC PNL TOTAL CA: CPT

## 2021-06-01 PROCEDURE — 36415 COLL VENOUS BLD VENIPUNCTURE: CPT

## 2021-06-01 PROCEDURE — 6370000000 HC RX 637 (ALT 250 FOR IP): Performed by: NURSE PRACTITIONER

## 2021-06-01 PROCEDURE — 6360000002 HC RX W HCPCS: Performed by: NURSE PRACTITIONER

## 2021-06-01 PROCEDURE — 94640 AIRWAY INHALATION TREATMENT: CPT

## 2021-06-01 PROCEDURE — 2580000003 HC RX 258: Performed by: NURSE PRACTITIONER

## 2021-06-01 PROCEDURE — 85027 COMPLETE CBC AUTOMATED: CPT

## 2021-06-01 RX ORDER — AMIODARONE HYDROCHLORIDE 200 MG/1
200 TABLET ORAL SEE ADMIN INSTRUCTIONS
Qty: 28 TABLET | Refills: 0 | Status: SHIPPED | OUTPATIENT
Start: 2021-06-01 | End: 2022-06-18

## 2021-06-01 RX ORDER — OXYCODONE HYDROCHLORIDE AND ACETAMINOPHEN 5; 325 MG/1; MG/1
1 TABLET ORAL EVERY 6 HOURS PRN
Qty: 28 TABLET | Refills: 0 | Status: SHIPPED | OUTPATIENT
Start: 2021-06-01 | End: 2021-06-08

## 2021-06-01 RX ORDER — SENNOSIDES 8.6 MG
1 TABLET ORAL DAILY PRN
Qty: 7 TABLET | Refills: 0 | Status: SHIPPED | OUTPATIENT
Start: 2021-06-01 | End: 2022-06-18

## 2021-06-01 RX ORDER — DOCUSATE SODIUM 100 MG/1
100 CAPSULE, LIQUID FILLED ORAL 2 TIMES DAILY PRN
Qty: 30 CAPSULE | Refills: 0 | Status: SHIPPED | OUTPATIENT
Start: 2021-06-01 | End: 2021-07-01

## 2021-06-01 RX ADMIN — IPRATROPIUM BROMIDE AND ALBUTEROL SULFATE 1 AMPULE: .5; 3 SOLUTION RESPIRATORY (INHALATION) at 17:26

## 2021-06-01 RX ADMIN — MAGNESIUM HYDROXIDE 30 ML: 2400 SUSPENSION ORAL at 06:44

## 2021-06-01 RX ADMIN — SENNOSIDES AND DOCUSATE SODIUM 1 TABLET: 8.6; 5 TABLET ORAL at 08:56

## 2021-06-01 RX ADMIN — IPRATROPIUM BROMIDE AND ALBUTEROL SULFATE 1 AMPULE: .5; 3 SOLUTION RESPIRATORY (INHALATION) at 13:11

## 2021-06-01 RX ADMIN — AMIODARONE HYDROCHLORIDE 200 MG: 200 TABLET ORAL at 08:56

## 2021-06-01 RX ADMIN — BISACODYL 10 MG: 5 TABLET, COATED ORAL at 08:56

## 2021-06-01 RX ADMIN — ENOXAPARIN SODIUM 40 MG: 40 INJECTION SUBCUTANEOUS at 08:56

## 2021-06-01 RX ADMIN — SODIUM CHLORIDE, PRESERVATIVE FREE 10 ML: 5 INJECTION INTRAVENOUS at 08:56

## 2021-06-01 RX ADMIN — IPRATROPIUM BROMIDE AND ALBUTEROL SULFATE 1 AMPULE: .5; 3 SOLUTION RESPIRATORY (INHALATION) at 09:03

## 2021-06-01 RX ADMIN — OXYCODONE 5 MG: 5 TABLET ORAL at 03:12

## 2021-06-01 ASSESSMENT — PAIN SCALES - GENERAL
PAINLEVEL_OUTOF10: 5
PAINLEVEL_OUTOF10: 5
PAINLEVEL_OUTOF10: 4

## 2021-06-01 ASSESSMENT — PAIN DESCRIPTION - ORIENTATION
ORIENTATION: OUTER
ORIENTATION: RIGHT;OUTER

## 2021-06-01 ASSESSMENT — PAIN DESCRIPTION - LOCATION
LOCATION: CHEST
LOCATION: CHEST

## 2021-06-01 ASSESSMENT — PAIN DESCRIPTION - PAIN TYPE
TYPE: ACUTE PAIN;SURGICAL PAIN
TYPE: ACUTE PAIN;SURGICAL PAIN

## 2021-06-01 NOTE — PROGRESS NOTES
POD#4 Awake, alert. No complaints. Denies CP, palpitations, SOB at rest, dizziness/lightheadedness. Vitals:    05/31/21 2300 06/01/21 0309 06/01/21 0625 06/01/21 0730   BP: 120/88 113/72  124/86   Pulse: 97 90  90   Resp: 20 18  20   Temp: 98.2 °F (36.8 °C) 98.2 °F (36.8 °C)  98.4 °F (36.9 °C)   TempSrc: Temporal Temporal  Temporal   SpO2: 92% 91%  90%   Weight:   185 lb 8 oz (84.1 kg)    Height:         O2: none      Intake/Output Summary (Last 24 hours) at 6/1/2021 0848  Last data filed at 6/1/2021 0846  Gross per 24 hour   Intake 600 ml   Output 400 ml   Net 200 ml         UO: 375mL/8hr   CT output:  Pleural: 5mL/8hr (25mL/24hrs)      Recent Labs     05/30/21  0648 05/31/21  0557 06/01/21  0635   WBC 13.3* 13.9* 12.5*   HGB 12.5 13.7 13.1   HCT 38.9 41.9 39.6    293 314      Recent Labs     05/30/21  0648 05/31/21  0557 06/01/21  0635   BUN 20 15 16   CREATININE 0.8 0.7 0.7     Telemetry: SR        PE  Cardiac: RRR  Lungs: decreased bases  Chest incision C/D/I, approximated, no erythema. Chest tube x 1 present and secure. +subq to chest wall, neck, face--improving  Abd: Soft, nontender, +BS  Ext: JOHNSON           POD#4     A/P: Stable s/p Right robotic VATS/Robotic RUL wedge resection/Robotic RULobectomy/Mediastinal lymph node dissection/Intercostal nerve block on 5/28/21  S/P bedside talc pleurodesis 5/30  Acute post operative anemia secondary to surgery  Maintaining NSR  VSS  CT without significant drainage. No air leak per [employer] chest drainage system. Chest tube removed without difficulty.  Patient tolerated well  Tolerating RA  Post-op pain intolerance--tolerable on current regimen--will improve with removal of ct today  Constipation--continue prn MOM and suppository--continue senna-s and oral bisacodyl--abdomen soft  Home today  Increase activity as tolerated  Encourage incentive spirometry   This patient's case and care plan was discussed with the attending surgeon

## 2021-06-01 NOTE — CARE COORDINATION
SOCIAL WORK/CASEMANAGEMENT TRANSITION OF CARE [id number], [address], [place], [phone]): I met with pt in the room this a.m. he lives with wife and both are filing or on disability. Pt said he is independent with no dme or hhc pta and now. Pt is a  but doesn't use the [place]. He has a 2 story home with 1 floor bed and bath. There are 3 front steps to enter home. Discharge order in for home today with no needs.  VICKY Ayala  6/1/2021

## 2021-06-01 NOTE — DISCHARGE SUMMARY
Physician Discharge Summary     Patient ID:  Staci Mohr  [id number]  47 y.o.  1965    Admit date: 5/28/2021    Discharge date: 6/1/2021     Admitting Physician: Rashel Kaur MD     Discharge Physician: Rashel Kaur MD    Admission Diagnoses: Lung nodule [R91.1]    Discharge Diagnoses:      POSTOPERATIVE DIAGNOSIS:  Lung nodule, right upper lobe, weakly PET  positive, non-small cell lung cancer. OPERATION:  1. Right robotic VATS. 2.  Robotic right upper lobe wedge resection. 3.  Robotic right upper lobectomy. 4.  Mediastinal lymph node dissection. 4.  Intercostal nerve block.         Discharged Condition: stable    Indication for Admission:   Lung nodule, right upper lobe, weakly PET positive, non-small cell lung cancer    Hospital Course: Course as above, and on 5/28/2021 the patient underwent a Right robotic VATS/Robotic RUL wedge resection/Robotic RULobectomy/Mediastinal lymph node dissection/Intercostal nerve block. He was extubated post-operatively, recovered in PACU, and transferred to the monitored stepdown unit in stable condition. He underwent a bedside talc pleurodesis on 5/30/2021 for persistent post-operative airleak. The airleak resolved, his chest tube was removed in the usual fashion, he was weaned off of supplemental oxygen, and was discharged to home in stable condition on POD 4 (6/1/2021).         Consults: pulmonary/intensive care    Discharge Exam:  Vitals   Vitals:     05/31/21 2300 06/01/21 0309 06/01/21 0625 06/01/21 0730   BP: 120/88 113/72   124/86   Pulse: 97 90   90   Resp: 20 18   20   Temp: 98.2 °F (36.8 °C) 98.2 °F (36.8 °C)   98.4 °F (36.9 °C)   TempSrc: Temporal Temporal   Temporal   SpO2: 92% 91%   90%   Weight:     185 lb 8 oz (84.1 kg)     Height:                 O2: none        Intake/Output Summary (Last 24 hours) at 6/1/2021 0848  Last data filed at 6/1/2021 0846      Gross per 24 hour   Intake 600 ml   Output 400 ml   Net 200 ml            UO: 375mL/8hr   CT output:  Pleural: 5mL/8hr (25mL/24hrs)              Recent Labs     05/30/21  0648 05/31/21  0557 06/01/21  0635   WBC 13.3* 13.9* 12.5*   HGB 12.5 13.7 13.1   HCT 38.9 41.9 39.6    293 314            Recent Labs     05/30/21  0648 05/31/21  0557 06/01/21  0635   BUN 20 15 16   CREATININE 0.8 0.7 0.7      Telemetry: SR        PE  Cardiac: RRR  Lungs: decreased bases  Chest incision C/D/I, approximated, no erythema. Chest tube x 1 present and secure. +subq to chest wall, neck, face--improving  Abd: Soft, nontender, +BS  Ext: JOHNSON      Disposition: home    Patient Instructions:    Libra Speedy   Home Medication Instructions EYW:[id number]    Printed on:06/01/21 0805   Medication Information                      ARTIFICIAL TEARS 1.4 % ophthalmic solution               [employer] D3 50 MCG (2000 UT) CAPS  Take 2,000 Units by mouth daily             Diphenhydramine-APAP, sleep, (TYLENOL PM EXTRA STRENGTH)  MG TABS  Take 1 tablet by mouth nightly as needed. otc med              DULERA 200-5 MCG/ACT inhaler  Inhale 1 puff into the lungs daily              Ginkgo Biloba 40 MG TABS  Take by mouth daily              Ginseng 250 MG CAPS  Take 1 capsule by mouth daily.  otc med              Multiple Vitamins-Minerals (CENTRUM SILVER PO)  Take by mouth daily                Activity: activity as tolerated, no heavy lifting, pushing, pulling with the surgical side for 6 weeks and no driving while on analgesics  Diet: regular diet  Wound Care: as directed    Follow-up with   Future Appointments   Date Time Provider Jennifer Autumn   6/15/2021 10:30 AM Deena Tapia MD CARDIO SURG [facility]         Smoking cessation education provided prior to discharge      Signed:  Dwayne Velazquez, MSN, APRN, FNP-BC, AGACNP-BC

## 2021-06-01 NOTE — PROGRESS NOTES
Pulmonary Progress Note  6/1/2021 7:47 AM  Subjective:   Admit Date: 5/28/2021  PCP: Rodger Levi MD  Interval History:   Feeling better with pain control but still uncomfortable  Dyspnea with activity  On room air  CT to R    Diet: Dietary Nutrition Supplements: Diabetic Oral Supplement  DIET GENERAL;  SOB is: with activity  Cough: None  Wheezing: None  chest pain: Moderate    Data:   Scheduled Meds:    bisacodyl  10 mg Oral Daily    lidocaine  2 patch Transdermal Daily    sodium chloride flush  10 mL Intravenous 2 times per day    enoxaparin  40 mg Subcutaneous Daily    sennosides-docusate sodium  1 tablet Oral BID    ipratropium-albuterol  1 ampule Inhalation Q4H WA    amiodarone  200 mg Oral BID       Continuous Infusions:    ropivacaine 0.2%      sodium chloride         PRN Meds: sennosides-docusate sodium, morphine, oxyCODONE **OR** oxyCODONE, sodium chloride flush, sodium chloride, ondansetron, magnesium hydroxide, bisacodyl, magnesium sulfate    I/O last 3 completed shifts: In: 480 [P.O.:480]  Out: 400 [Urine:375; Chest Tube:25]    No intake/output data recorded. Intake/Output Summary (Last 24 hours) at 6/1/2021 0747  Last data filed at 6/1/2021 7497  Gross per 24 hour   Intake 480 ml   Output 400 ml   Net 80 ml       Patient Vitals for the past 96 hrs (Last 3 readings):   Weight   06/01/21 0625 185 lb 8 oz (84.1 kg)   05/31/21 0445 189 lb 6.4 oz (85.9 kg)   05/30/21 0615 191 lb 9.6 oz (86.9 kg)         Recent Labs     05/30/21  0648 05/31/21  0557   WBC 13.3* 13.9*   HGB 12.5 13.7   HCT 38.9 41.9   MCV 96.5 96.5    293     Recent Labs     05/30/21  0648 05/31/21  0557    137   K 4.0 4.5    98   CO2 28 27   BUN 20 15   CREATININE 0.8 0.7     No results for input(s): PROT, ALB, ALKPHOS, ALT, AST, BILITOT, AMYLASE, LIPASE in the last 72 hours. No results for input(s): INR, APTT in the last 72 hours. No results for input(s): CKTOTAL, CKMB, TROPONINI in the last 72 hours.   No lesions  HEENT: mucous membranes are moist  Neck: No JVD  Lungs: symmetrical expansion, clear to auscultation, no use of accessory muscles, right chest tube in place on suction with sub q emph noted   Heart: S1S2 no murmurs,  Abdomen: soft, non tender,   Extremities: no peripheral edema  Neurologic: Alert, oriented times 3  Affect: pleasant      Assessment:   Patient Active Problem List:    Mr. Omid Mauricio a 61 y/o male with past medical history noted for asthma/COPD, lung nodule that presented to [facility] for RUL wedge resection.      CT Chest 9/10/2018 noted 4 mm RUL lung nodule  CT Chest 10/28/2020 noted 6 mm RUL lung nodule  CT Chest 2/18/2021 noted 7 mm RUL nodule with irregular margins  PET/CT 4/13/2021 noted interval SUV at cutoff 2.5 with no other disseminated metabolic process.     2/89/7045:2.  Right robotic VATS. NSCLC with negative margins.  Chest x-ray right pneumo mediastinum chest wall emphysema  2.  Robotic right upper lobe wedge resection. 3.  Robotic right upper lobectomy. 4.  Mediastinal lymph node dissection. 4.  Intercostal nerve block   5/28/2021:Pulmonary has been consulted post-operatively. 5/30 underwent right chemical pleurodesis for persistent pneumothorax  6/1 Room air, CT to R to suction. 25 cc out of CT    1. S/p RU lobectomy 5/28 non-small cell lung cancer for RUL nodule PET positive  2. Pre op spirometry mild obstructive FEV1 2.89 L 69% predicted DLCO 48%  3. Anxiety  4. History of sinus surgery    Plan:   1. On room air, O2 if needed  2. R CT per CTS; on suction, hope to remove today  3. Encouraged IS  4. Ambulated as tolerated     EDWAR Simon - CNP  I have personally participated in the history, exam, medical decision making with the NP on the date of service and I agree with all of the pertinent clinical information unless otherwise noted. I have also reviewed and agree with the past medical, family, and social history unless otherwise noted.     Pain is much improved since chest tube is out  Patient is ambulating  Okay for discharge  Follow-up in 1 month

## 2021-06-09 LAB
BLOOD BANK DISPENSE STATUS: NORMAL
BLOOD BANK PRODUCT CODE: NORMAL
BPU ID: NORMAL
DESCRIPTION BLOOD BANK: NORMAL

## 2021-06-15 ENCOUNTER — OFFICE VISIT (OUTPATIENT)
Dept: CARDIOTHORACIC SURGERY | Age: 56
End: 2021-06-15

## 2021-06-15 VITALS
DIASTOLIC BLOOD PRESSURE: 76 MMHG | HEIGHT: 74 IN | HEART RATE: 74 BPM | WEIGHT: 186 LBS | BODY MASS INDEX: 23.87 KG/M2 | SYSTOLIC BLOOD PRESSURE: 112 MMHG

## 2021-06-15 DIAGNOSIS — Z09 S/P LUNG SURGERY, FOLLOW-UP EXAM: Primary | ICD-10-CM

## 2021-06-15 DIAGNOSIS — Z48.02 VISIT FOR SUTURE REMOVAL: ICD-10-CM

## 2021-06-15 PROBLEM — C34.11 MALIGNANT NEOPLASM OF UPPER LOBE OF RIGHT LUNG (HCC): Status: ACTIVE | Noted: 2021-06-15

## 2021-06-15 PROCEDURE — 99024 POSTOP FOLLOW-UP VISIT: CPT | Performed by: NURSE PRACTITIONER

## 2022-02-17 ENCOUNTER — HOSPITAL ENCOUNTER (OUTPATIENT)
Dept: CT IMAGING | Age: 57
Discharge: HOME | End: 2022-02-19
Payer: COMMERCIAL

## 2022-02-17 DIAGNOSIS — C34.11 MALIGNANT NEOPLASM OF UPPER LOBE OF RIGHT LUNG (HCC): ICD-10-CM

## 2022-02-17 PROCEDURE — 71260 CT THORAX DX C+: CPT

## 2022-02-17 PROCEDURE — 6360000004 HC RX CONTRAST MEDICATION: Performed by: RADIOLOGY

## 2022-02-17 RX ORDER — SODIUM CHLORIDE 0.9 % (FLUSH) 0.9 %
10 SYRINGE (ML) INJECTION
Status: ACTIVE | OUTPATIENT
Start: 2022-02-17 | End: 2022-02-17

## 2022-02-17 RX ADMIN — IOPAMIDOL 90 ML: 755 INJECTION, SOLUTION INTRAVENOUS at 10:04

## 2022-06-18 ENCOUNTER — HOSPITAL ENCOUNTER (EMERGENCY)
Age: 57
Discharge: HOME | End: 2022-06-18
Payer: COMMERCIAL

## 2022-06-18 ENCOUNTER — APPOINTMENT (OUTPATIENT)
Dept: GENERAL RADIOLOGY | Age: 57
End: 2022-06-18
Payer: COMMERCIAL

## 2022-06-18 VITALS
DIASTOLIC BLOOD PRESSURE: 74 MMHG | SYSTOLIC BLOOD PRESSURE: 113 MMHG | HEART RATE: 99 BPM | RESPIRATION RATE: 18 BRPM | WEIGHT: 175 LBS | TEMPERATURE: 98.2 F | HEIGHT: 74 IN | BODY MASS INDEX: 22.46 KG/M2 | OXYGEN SATURATION: 99 %

## 2022-06-18 DIAGNOSIS — S92.512A DISPLACED FRACTURE OF PROXIMAL PHALANX OF LEFT LESSER TOE(S), INITIAL ENCOUNTER FOR CLOSED FRACTURE: Primary | ICD-10-CM

## 2022-06-18 PROCEDURE — 99283 EMERGENCY DEPT VISIT LOW MDM: CPT

## 2022-06-18 PROCEDURE — 73630 X-RAY EXAM OF FOOT: CPT

## 2022-06-18 NOTE — ED PROVIDER NOTES
[facility]  Department of Emergency Medicine   ED  Encounter Note  Admit Date/RoomTime: 2022  5:15 PM  ED Room: [place] [address]/    NAME: Uzma Call  : 1965  MRN: [id number]     Chief Complaint:  Toe Injury (Stubbed toe on left foot a week ago and believes it is broken. )    History of Present Illness       Uzma Call is a 60 y.o. old male presenting to the emergency department by private vehicle persistent left fourth toe pain after injury 1 week ago. Patient states he hit it off of a wooden chest.  Patient states his symptoms are mild in severity and describes as an aching pain. Patient denies anything make it better or worse. Patient denies previous injury. Denies fever/chills, headache, vision change, dizziness, chest pain, dyspnea, abdominal pain, NVD, numbness/weakness. ROS   Pertinent positives and negatives are stated within HPI, all other systems reviewed and are negative. Past Medical History:  has a past medical history of Anxiety, COPD with asthma ([address] Lung nodule, and Vitamin D deficiency. Surgical History:  has a past surgical history that includes eye surgery (); Heel spur surgery (02/15/2011); Colonoscopy; pr nasal/sinus ndsc surg w/dilation frontal sinus (N/A, 2018); knee surgery; and Thoracoscopy (Right, 2021). Social History:  reports that he quit smoking about 3 years ago. His smoking use included cigarettes. He has a 30.00 pack-year smoking history. He has never used smokeless tobacco. He reports current alcohol use of about 5.0 standard drinks of alcohol per week. He reports that he does not use drugs. Family History: family history includes Cancer in his father; Diabetes in his mother; Gerhardt Done in his mother; Stroke in his mother.      Allergies: Ibuprofen    Physical Exam   Oxygen Saturation Interpretation: Normal.        ED Triage Vitals   BP Temp Temp src Heart Rate Resp SpO2 Height Weight 06/18/22 1712 06/18/22 1645 -- 06/18/22 1645 06/18/22 1645 06/18/22 1645 06/18/22 1712 06/18/22 1712   113/74 98.2 °F (36.8 °C)  99 18 99 % 6' 2\" (1.88 m) 175 lb (79.4 kg)         Constitutional:  Alert, development consistent with age. Neck:  Normal ROM. Supple. Left Toe(s):  4th toe proximal phalanx            Tenderness: mild. Swelling: None. Deformity: no deformity observed/palpated. ROM: full range with pain. Skin:  no wounds, erythema, or swelling. Neurovascular: Motor deficit: none. Sensory deficit: none. Pulse deficit: none. Capillary refill: normal.  Left Foot:             Tenderness:  none. Swelling: None. Deformity: no deformity observed/palpated. ROM: full range of motion. Skin:  no wounds, erythema, or swelling. Gait:  normal.  Lymphatics: No lymphangitis or adenopathy noted. Neurological:  Oriented. Motor functions intact. .    Lab / Imaging Results   (All laboratory and radiology results have been personally reviewed by myself)  Labs:  No results found for this visit on 06/18/22. Imaging: All Radiology results interpreted by Radiologist unless otherwise noted. XR FOOT LEFT (MIN 3 VIEWS)   Final Result   Minimally displaced impaction fracture of the distal aspect of the proximal   phalanx of the left 4th digit. Regional soft tissue swelling. ED Course / Medical Decision Making   Medications - No data to display    Consult(s):   None    Procedure(s):  None    MDM: Patient presenting with toe pain. Patient is in no acute distress, afebrile, nontoxic appearance. Patient's x-ray showing a minimally displaced impaction fracture of the distal aspect of the proximal phalanx of the left fourth digit. Reduction was not attempted at this time due to it happening 1 week ago. Patient's toes lina taped and he was placed in a postop shoe.   Patient

## 2022-07-16 PROCEDURE — 99285 EMERGENCY DEPT VISIT HI MDM: CPT

## 2022-07-17 ENCOUNTER — APPOINTMENT (OUTPATIENT)
Dept: GENERAL RADIOLOGY | Age: 57
End: 2022-07-17
Payer: COMMERCIAL

## 2022-07-17 ENCOUNTER — APPOINTMENT (OUTPATIENT)
Dept: CT IMAGING | Age: 57
End: 2022-07-17
Payer: COMMERCIAL

## 2022-07-17 ENCOUNTER — HOSPITAL ENCOUNTER (EMERGENCY)
Age: 57
Discharge: HOME | End: 2022-07-17
Attending: EMERGENCY MEDICINE
Payer: COMMERCIAL

## 2022-07-17 VITALS
DIASTOLIC BLOOD PRESSURE: 89 MMHG | OXYGEN SATURATION: 98 % | HEIGHT: 74 IN | BODY MASS INDEX: 17.97 KG/M2 | TEMPERATURE: 98.5 F | HEART RATE: 86 BPM | SYSTOLIC BLOOD PRESSURE: 129 MMHG | RESPIRATION RATE: 16 BRPM | WEIGHT: 140 LBS

## 2022-07-17 DIAGNOSIS — J36 PERITONSILLAR ABSCESS: Primary | ICD-10-CM

## 2022-07-17 LAB
ANION GAP SERPL CALCULATED.3IONS-SCNC: 14 MMOL/L (ref 7–16)
APTT: 30 SEC (ref 24.5–35.1)
BASOPHILS ABSOLUTE: 0.05 E9/L (ref 0–0.2)
BASOPHILS RELATIVE PERCENT: 0.3 % (ref 0–2)
BILIRUBIN URINE: NEGATIVE
BLOOD, URINE: NEGATIVE
BUN BLDV-MCNC: 13 MG/DL (ref 6–20)
CALCIUM SERPL-MCNC: 9.1 MG/DL (ref 8.6–10.2)
CHLORIDE BLD-SCNC: 101 MMOL/L (ref 98–107)
CLARITY: CLEAR
CO2: 24 MMOL/L (ref 22–29)
COLOR: YELLOW
CREAT SERPL-MCNC: 0.9 MG/DL (ref 0.7–1.2)
EOSINOPHILS ABSOLUTE: 0.06 E9/L (ref 0.05–0.5)
EOSINOPHILS RELATIVE PERCENT: 0.3 % (ref 0–6)
GFR AFRICAN AMERICAN: >60
GFR NON-AFRICAN AMERICAN: >60 ML/MIN/1.73
GLUCOSE BLD-MCNC: 102 MG/DL (ref 74–99)
GLUCOSE URINE: NEGATIVE MG/DL
HCT VFR BLD CALC: 44.8 % (ref 37–54)
HEMOGLOBIN: 15 G/DL (ref 12.5–16.5)
IMMATURE GRANULOCYTES #: 0.1 E9/L
IMMATURE GRANULOCYTES %: 0.5 % (ref 0–5)
INR BLD: 1
KETONES, URINE: 15 MG/DL
LACTIC ACID, SEPSIS: 1.1 MMOL/L (ref 0.5–1.9)
LACTIC ACID, SEPSIS: 1.9 MMOL/L (ref 0.5–1.9)
LEUKOCYTE ESTERASE, URINE: NEGATIVE
LYMPHOCYTES ABSOLUTE: 2.18 E9/L (ref 1.5–4)
LYMPHOCYTES RELATIVE PERCENT: 11.4 % (ref 20–42)
MCH RBC QN AUTO: 31.7 PG (ref 26–35)
MCHC RBC AUTO-ENTMCNC: 33.5 % (ref 32–34.5)
MCV RBC AUTO: 94.7 FL (ref 80–99.9)
MONOCYTES ABSOLUTE: 1.44 E9/L (ref 0.1–0.95)
MONOCYTES RELATIVE PERCENT: 7.5 % (ref 2–12)
NEUTROPHILS ABSOLUTE: 15.36 E9/L (ref 1.8–7.3)
NEUTROPHILS RELATIVE PERCENT: 80 % (ref 43–80)
NITRITE, URINE: NEGATIVE
PDW BLD-RTO: 14.2 FL (ref 11.5–15)
PH UA: 7.5 (ref 5–9)
PLATELET # BLD: 315 E9/L (ref 130–450)
PMV BLD AUTO: 9 FL (ref 7–12)
POTASSIUM REFLEX MAGNESIUM: 4.4 MMOL/L (ref 3.5–5)
PROTEIN UA: NEGATIVE MG/DL
PROTHROMBIN TIME: 11.5 SEC (ref 9.3–12.4)
RBC # BLD: 4.73 E12/L (ref 3.8–5.8)
SARS-COV-2, NAAT: NOT DETECTED
SODIUM BLD-SCNC: 139 MMOL/L (ref 132–146)
SPECIFIC GRAVITY UA: 1.01 (ref 1–1.03)
STREP GRP A PCR: NEGATIVE
TROPONIN, HIGH SENSITIVITY: <6 NG/L (ref 0–11)
UROBILINOGEN, URINE: 0.2 E.U./DL
WBC # BLD: 19.2 E9/L (ref 4.5–11.5)

## 2022-07-17 PROCEDURE — 83605 ASSAY OF LACTIC ACID: CPT

## 2022-07-17 PROCEDURE — 93005 ELECTROCARDIOGRAM TRACING: CPT | Performed by: PHYSICIAN ASSISTANT

## 2022-07-17 PROCEDURE — 81003 URINALYSIS AUTO W/O SCOPE: CPT

## 2022-07-17 PROCEDURE — 87040 BLOOD CULTURE FOR BACTERIA: CPT

## 2022-07-17 PROCEDURE — 2580000003 HC RX 258: Performed by: EMERGENCY MEDICINE

## 2022-07-17 PROCEDURE — 87088 URINE BACTERIA CULTURE: CPT

## 2022-07-17 PROCEDURE — 6360000002 HC RX W HCPCS: Performed by: EMERGENCY MEDICINE

## 2022-07-17 PROCEDURE — 87075 CULTR BACTERIA EXCEPT BLOOD: CPT

## 2022-07-17 PROCEDURE — 87880 STREP A ASSAY W/OPTIC: CPT

## 2022-07-17 PROCEDURE — 85025 COMPLETE CBC W/AUTO DIFF WBC: CPT

## 2022-07-17 PROCEDURE — 80048 BASIC METABOLIC PNL TOTAL CA: CPT

## 2022-07-17 PROCEDURE — 85610 PROTHROMBIN TIME: CPT

## 2022-07-17 PROCEDURE — 71045 X-RAY EXAM CHEST 1 VIEW: CPT

## 2022-07-17 PROCEDURE — 6360000004 HC RX CONTRAST MEDICATION: Performed by: RADIOLOGY

## 2022-07-17 PROCEDURE — 87070 CULTURE OTHR SPECIMN AEROBIC: CPT

## 2022-07-17 PROCEDURE — 96375 TX/PRO/DX INJ NEW DRUG ADDON: CPT

## 2022-07-17 PROCEDURE — 87205 SMEAR GRAM STAIN: CPT

## 2022-07-17 PROCEDURE — 96374 THER/PROPH/DIAG INJ IV PUSH: CPT

## 2022-07-17 PROCEDURE — 84484 ASSAY OF TROPONIN QUANT: CPT

## 2022-07-17 PROCEDURE — 36415 COLL VENOUS BLD VENIPUNCTURE: CPT

## 2022-07-17 PROCEDURE — 87635 SARS-COV-2 COVID-19 AMP PRB: CPT

## 2022-07-17 PROCEDURE — 85730 THROMBOPLASTIN TIME PARTIAL: CPT

## 2022-07-17 PROCEDURE — 70491 CT SOFT TISSUE NECK W/DYE: CPT

## 2022-07-17 RX ORDER — PREDNISONE 20 MG/1
40 TABLET ORAL DAILY
Qty: 10 TABLET | Refills: 0 | Status: SHIPPED | OUTPATIENT
Start: 2022-07-17 | End: 2022-07-22

## 2022-07-17 RX ORDER — SODIUM CHLORIDE 0.9 % (FLUSH) 0.9 %
5-40 SYRINGE (ML) INJECTION EVERY 12 HOURS SCHEDULED
Status: DISCONTINUED | OUTPATIENT
Start: 2022-07-17 | End: 2022-07-17 | Stop reason: HOSPADM

## 2022-07-17 RX ORDER — DEXAMETHASONE SODIUM PHOSPHATE 10 MG/ML
10 INJECTION, SOLUTION INTRAMUSCULAR; INTRAVENOUS ONCE
Status: COMPLETED | OUTPATIENT
Start: 2022-07-17 | End: 2022-07-17

## 2022-07-17 RX ORDER — AMOXICILLIN AND CLAVULANATE POTASSIUM 875; 125 MG/1; MG/1
1 TABLET, FILM COATED ORAL 2 TIMES DAILY
Qty: 20 TABLET | Refills: 0 | Status: SHIPPED | OUTPATIENT
Start: 2022-07-17 | End: 2022-07-27

## 2022-07-17 RX ORDER — SODIUM CHLORIDE 0.9 % (FLUSH) 0.9 %
10 SYRINGE (ML) INJECTION
Status: DISCONTINUED | OUTPATIENT
Start: 2022-07-17 | End: 2022-07-17 | Stop reason: HOSPADM

## 2022-07-17 RX ORDER — SODIUM CHLORIDE 0.9 % (FLUSH) 0.9 %
5-40 SYRINGE (ML) INJECTION PRN
Status: DISCONTINUED | OUTPATIENT
Start: 2022-07-17 | End: 2022-07-17 | Stop reason: HOSPADM

## 2022-07-17 RX ORDER — SODIUM CHLORIDE 9 MG/ML
INJECTION, SOLUTION INTRAVENOUS PRN
Status: DISCONTINUED | OUTPATIENT
Start: 2022-07-17 | End: 2022-07-17 | Stop reason: HOSPADM

## 2022-07-17 RX ADMIN — AMPICILLIN SODIUM AND SULBACTAM SODIUM 3000 MG: 2; 1 INJECTION, POWDER, FOR SOLUTION INTRAMUSCULAR; INTRAVENOUS at 04:41

## 2022-07-17 RX ADMIN — IOPAMIDOL 50 ML: 755 INJECTION, SOLUTION INTRAVENOUS at 06:25

## 2022-07-17 RX ADMIN — DEXAMETHASONE SODIUM PHOSPHATE 10 MG: 10 INJECTION, SOLUTION INTRAMUSCULAR; INTRAVENOUS at 04:41

## 2022-07-17 ASSESSMENT — ENCOUNTER SYMPTOMS
COLOR CHANGE: 0
SHORTNESS OF BREATH: 0
STRIDOR: 0
SINUS PAIN: 0
CHEST TIGHTNESS: 0
EYE DISCHARGE: 0
DIARRHEA: 0
TROUBLE SWALLOWING: 1
VOICE CHANGE: 1
COUGH: 0
SORE THROAT: 1
EYE PAIN: 0
VOMITING: 0
ABDOMINAL PAIN: 0
ALLERGIC/IMMUNOLOGIC NEGATIVE: 1
SINUS PRESSURE: 0
CONSTIPATION: 0
BACK PAIN: 0
EYE REDNESS: 0
NAUSEA: 0

## 2022-07-17 NOTE — CONSULTS
Subjective:      Patient ID:  Alicia Pierce is a 60 y.o. male. HPI:    Pt is here for evaluation of  throat pain. The symptoms have been present for 1 days ago. Symptoms are primarily located on the left, and discomfort is fairly severe. Treatment thus far has included none; symptoms have been worsened with treatment. Studies performed thus far include: CT scan of neck    Patient's medications, allergies, past medical, surgical, social and family histories were reviewed and updated as appropriate. Review of Systems   Constitutional:  Negative for chills, fatigue and fever. HENT:  Positive for sore throat, trouble swallowing and voice change. Eyes:  Negative for discharge and redness. Respiratory:  Negative for cough, shortness of breath and stridor. Gastrointestinal:  Negative for nausea and vomiting. Endocrine: Negative. Genitourinary: Negative. Musculoskeletal: Negative. Skin:  Negative for color change and rash. Allergic/Immunologic: Negative. Neurological:  Negative for dizziness, speech difficulty and headaches. Hematological: Negative. Psychiatric/Behavioral:  Negative for agitation and confusion. All other systems reviewed and are negative. Objective:    Physical Exam  Constitutional:       Appearance: Normal appearance. HENT:      Head: Normocephalic and atraumatic. Right Ear: External ear normal.      Left Ear: External ear normal.      Nose: Nose normal.      Mouth/Throat:     Cardiovascular:      Rate and Rhythm: Normal rate. Musculoskeletal:         General: Normal range of motion. Cervical back: Normal range of motion. Skin:     General: Skin is warm and dry. Neurological:      Mental Status: He is alert. I&D of Abscess:  Pt was anesthetized with lidocaine with epinephrine. A #15 blade scalpel was used to make a small incision in the superior lateral area of the left tonsillar pillar.   A hemostat was then used to open up the

## 2022-07-17 NOTE — ED NOTES
Department of Emergency Medicine  FIRST PROVIDER TRIAGE NOTE             Independent MLP           7/16/22  11:53 PM EDT    Date of Encounter: 7/16/22   MRN: [id number]      HPI: Isabella Singh is a 60 y.o. male who presents to the ED for Fever (Pt to ED stating he has a sore throat, feels weak, and has L-neck swelling. -SOB, -CP. Pt states he has had low grade fevers at home. Pt arrives A&Ox3, respirations even and unlabored, NAD)    Pt reports generalized weakness, fatigue nad fevers. He has also had low BP which he has no history of. ROS: Negative for cp or sob. PE: Gen Appearance/Constitutional: alert  Musculoskeletal: moves all extremities x 4     Initial Plan of Care: All treatment areas with department are currently occupied. Plan to order/Initiate the following while awaiting opening in ED: labs, EKG, and imaging studies.   Initiate Treatment-Testing, Proceed toTreatment Area When Bed Available for ED Attending/MLP to Continue Care    Electronically signed by Sammy Lozada PA-C   DD: 7/16/22       Sammy Lozada PA-C  07/16/22 1015

## 2022-07-17 NOTE — ED PROVIDER NOTES
constipation, diarrhea, nausea and vomiting. Genitourinary:  Negative for dysuria, flank pain, frequency, hematuria and urgency. Musculoskeletal:  Positive for neck pain (Left). Negative for arthralgias, back pain, gait problem, joint swelling and myalgias. Skin:  Negative for rash. Neurological:  Positive for weakness. Negative for dizziness, tremors, syncope, light-headedness, numbness and headaches. Physical Exam  Constitutional:       General: He is not in acute distress. Appearance: Normal appearance. He is normal weight. He is not ill-appearing. HENT:      Head: Normocephalic and atraumatic. Right Ear: External ear normal.      Left Ear: External ear normal.      Nose: Nose normal.      Mouth/Throat:      Mouth: Mucous membranes are moist.      Pharynx: Oropharynx is clear. No oropharyngeal exudate or posterior oropharyngeal erythema. Comments: Hard palate edema noted on the left roof of his mouth without any uvular deviation  Eyes:      Extraocular Movements: Extraocular movements intact. Conjunctiva/sclera: Conjunctivae normal.   Cardiovascular:      Rate and Rhythm: Regular rhythm. Tachycardia present. Pulses: Normal pulses. Heart sounds: Normal heart sounds. Pulmonary:      Effort: Pulmonary effort is normal. No respiratory distress. Breath sounds: Normal breath sounds. No wheezing. Abdominal:      General: Abdomen is flat. Bowel sounds are normal. There is no distension. Palpations: Abdomen is soft. Tenderness: There is no abdominal tenderness. There is no guarding or rebound. Musculoskeletal:         General: Swelling (Mild edema noted to left neck) and tenderness present. Normal range of motion. Cervical back: Normal range of motion and neck supple. Tenderness (Left anterolateral neck) present. No rigidity. Lymphadenopathy:      Cervical: Cervical adenopathy (Left sided) present. Skin:     General: Skin is warm and dry. Findings: No rash. Neurological:      General: No focal deficit present. Mental Status: He is alert and oriented to person, place, and time. Mental status is at baseline. Cranial Nerves: No cranial nerve deficit. Sensory: No sensory deficit. Motor: No weakness. Psychiatric:         Mood and Affect: Mood normal.         Behavior: Behavior normal.        Procedures       MDM  Number of Diagnoses or Management Options  Peritonsillar abscess  Diagnosis management comments: This is a 60year old male who presents to the ED due to fever. Patient was initially given a dose of Unasyn and Decadron. CBC revealed elevated leukocytosis white count of 19.2. BMP was benign with normal limits. Troponin was less than 6. Strep and COVID were both negative. Coags were normal.  Patient's lactic acid was also normal and urinalysis was benign. Chest x-ray revealed no acute process. CT soft tissue neck revealed a left peritonsillar peripherally enhancing collection measuring 2 x 1 x 1.2 cm extending into the glossopharyngeal sulcus with asymmetric fat effacement of the left parapharyngeal and submandibular spaces which may be inflammatory/infectious or possibly malignancy. Patient will be sent home on a short course of steroids and Augmentin. He has been given referral to follow-up with ENT and his primary care provider. Patient has been told to come back to the ED if symptoms return, worsen or change at any time. Amount and/or Complexity of Data Reviewed  Clinical lab tests: reviewed  Tests in the radiology section of CPT®: reviewed  Tests in the medicine section of CPT®: reviewed           ED Course as of 07/17/22 1956   [place] Jul 17, 2022   0750 Patient is noted to have a 2 x 1 x 1 peritonsillar abscess, does have some redness on the left posterior pharynx. Airways patent no complaints of shortness of breath tolerating his secretions.   We will touch base with ENT [CB]   1685 ENT resident WBC 19.2 (H) 4.5 - 11.5 E9/L    RBC 4.73 3.80 - 5.80 E12/L    Hemoglobin 15.0 12.5 - 16.5 g/dL    Hematocrit 44.8 37.0 - 54.0 %    MCV 94.7 80.0 - 99.9 fL    MCH 31.7 26.0 - 35.0 pg    MCHC 33.5 32.0 - 34.5 %    RDW 14.2 11.5 - 15.0 fL    Platelets 987 203 - 865 E9/L    MPV 9.0 7.0 - 12.0 fL    Neutrophils % 80.0 43.0 - 80.0 %    Immature Granulocytes % 0.5 0.0 - 5.0 %    Lymphocytes % 11.4 (L) 20.0 - 42.0 %    Monocytes % 7.5 2.0 - 12.0 %    Eosinophils % 0.3 0.0 - 6.0 %    Basophils % 0.3 0.0 - 2.0 %    Neutrophils Absolute 15.36 (H) 1.80 - 7.30 E9/L    Immature Granulocytes # 0.10 E9/L    Lymphocytes Absolute 2.18 1.50 - 4.00 E9/L    Monocytes Absolute 1.44 (H) 0.10 - 0.95 E9/L    Eosinophils Absolute 0.06 0.05 - 0.50 E9/L    Basophils Absolute 0.05 0.00 - 0.20 P5/R   Basic Metabolic Panel w/ Reflex to MG   Result Value Ref Range    Sodium 139 132 - 146 mmol/L    Potassium reflex Magnesium 4.4 3.5 - 5.0 mmol/L    Chloride 101 98 - 107 mmol/L    CO2 24 22 - 29 mmol/L    Anion Gap 14 7 - 16 mmol/L    Glucose 102 (H) 74 - 99 mg/dL    BUN 13 6 - 20 mg/dL    CREATININE 0.9 0.7 - 1.2 mg/dL    GFR Non-African American >60 >=60 mL/min/1.73    GFR African American >60     Calcium 9.1 8.6 - 10.2 mg/dL   Troponin   Result Value Ref Range    Troponin, High Sensitivity <6 0 - 11 ng/L   Urinalysis   Result Value Ref Range    Color, UA Yellow Straw/Yellow    Clarity, UA Clear Clear    Glucose, Ur Negative Negative mg/dL    Bilirubin Urine Negative Negative    Ketones, Urine 15 (A) Negative mg/dL    Specific Gravity, UA 1.015 1.005 - 1.030    Blood, Urine Negative Negative    pH, UA 7.5 5.0 - 9.0    Protein, UA Negative Negative mg/dL    Urobilinogen, Urine 0.2 <2.0 E.U./dL    Nitrite, Urine Negative Negative    Leukocyte Esterase, Urine Negative Negative   Lactate, Sepsis   Result Value Ref Range    Lactic Acid, Sepsis 1.9 0.5 - 1.9 mmol/L   Lactate, Sepsis   Result Value Ref Range    Lactic Acid, Sepsis 1.1 0.5 - 1.9 mmol/L APTT   Result Value Ref Range    aPTT 30.0 24.5 - 35.1 sec   Protime-INR   Result Value Ref Range    Protime 11.5 9.3 - 12.4 sec    INR 1.0    EKG 12 Lead   Result Value Ref Range    Ventricular Rate 96 BPM    Atrial Rate 96 BPM    P-R Interval 132 ms    QRS Duration 88 ms    Q-T Interval 350 ms    QTc Calculation (Bazett) 442 ms    P Axis 77 degrees    R Axis 55 degrees    T Axis 67 degrees       Radiology:  CT SOFT TISSUE NECK W CONTRAST   Final Result   Left peritonsillar peripherally enhancing collection present measuring at   least 2.0 x 1.0 x 1.2 cm extending into the glossopharyngeal sulcus with   asymmetric fat effacement of the left parapharyngeal and submandibular   spaces. Finding may be related to an inflammatory/infectious process with   possibility of malignancy not excluded in the appropriate clinical setting. XR CHEST PORTABLE   Final Result   No acute process. ------------------------- NURSING NOTES AND VITALS REVIEWED ---------------------------  Date / Time Roomed:  7/17/2022  3:31 AM  ED Bed Assignment:  RAMÍREZ OLIVER/PHUONG    The nursing notes within the ED encounter and vital signs as below have been reviewed. /89   Pulse 86   Temp 98.5 °F (36.9 °C)   Resp 16   Ht 6' 2\" (1.88 m)   Wt 140 lb (63.5 kg)   SpO2 98%   BMI 17.97 kg/m²   Oxygen Saturation Interpretation: Normal      ------------------------------------------ PROGRESS NOTES ------------------------------------------  7:56 PM EDT  I have spoken with the patient and discussed todays results, in addition to providing specific details for the plan of care and counseling regarding the diagnosis and prognosis. Their questions are answered at this time and they are agreeable with the plan. I discussed at length with them reasons for immediate return here for re evaluation. They will followup with their  ENT and primary care physician by calling their office tomorrow.       --------------------------------- ADDITIONAL PROVIDER NOTES ---------------------------------  At this time the patient is without objective evidence of an acute process requiring hospitalization or inpatient management. They have remained hemodynamically stable throughout their entire ED visit and are stable for discharge with outpatient follow-up. The plan has been discussed in detail and they are aware of the specific conditions for emergent return, as well as the importance of follow-up. Discharge Medication List as of 7/17/2022  8:51 AM        START taking these medications    Details   amoxicillin-clavulanate (AUGMENTIN) 875-125 MG per tablet Take 1 tablet by mouth in the morning and 1 tablet before bedtime. Do all this for 10 days. , Disp-20 tablet, R-0Print      predniSONE (DELTASONE) 20 MG tablet Take 2 tablets by mouth in the morning for 5 days. , Disp-10 tablet, R-0Print             Diagnosis:  1. Peritonsillar abscess        Disposition:  Patient's disposition: Discharge to home  Patient's condition is stable. Patient was seen and evaluated by myself and my attending Deysi Garcia MD. Assessment and Plan discussed with attending provider, please see attestation for final plan of care.      DO Amena Ness DO  Resident  07/17/22 2039

## 2022-07-18 LAB
EKG ATRIAL RATE: 96 BPM
EKG P AXIS: 77 DEGREES
EKG P-R INTERVAL: 132 MS
EKG Q-T INTERVAL: 350 MS
EKG QRS DURATION: 88 MS
EKG QTC CALCULATION (BAZETT): 442 MS
EKG R AXIS: 55 DEGREES
EKG T AXIS: 67 DEGREES
EKG VENTRICULAR RATE: 96 BPM

## 2022-07-19 LAB
GRAM STAIN RESULT: NORMAL
URINE CULTURE, ROUTINE: NORMAL
WOUND/ABSCESS: NORMAL

## 2022-07-20 LAB
ANAEROBIC CULTURE: ABNORMAL
ANAEROBIC CULTURE: ABNORMAL
ORGANISM: ABNORMAL

## 2022-07-22 LAB
BLOOD CULTURE, ROUTINE: NORMAL
CULTURE, BLOOD 2: NORMAL

## 2022-11-01 ENCOUNTER — HOSPITAL ENCOUNTER (OUTPATIENT)
Age: 57
Discharge: HOME | End: 2022-11-01
Payer: COMMERCIAL

## 2022-11-01 ENCOUNTER — HOSPITAL ENCOUNTER (OUTPATIENT)
Dept: CT IMAGING | Age: 57
Discharge: HOME | End: 2022-11-03
Payer: COMMERCIAL

## 2022-11-01 DIAGNOSIS — C34.11 MALIGNANT NEOPLASM OF UPPER LOBE, RIGHT BRONCHUS OR LUNG (HCC): ICD-10-CM

## 2022-11-01 LAB
BUN BLDV-MCNC: 10 MG/DL (ref 6–20)
CREAT SERPL-MCNC: 0.8 MG/DL (ref 0.7–1.2)
GFR SERPL CREATININE-BSD FRML MDRD: >60 ML/MIN/1.73

## 2022-11-01 PROCEDURE — 82565 ASSAY OF CREATININE: CPT

## 2022-11-01 PROCEDURE — 84520 ASSAY OF UREA NITROGEN: CPT

## 2022-11-01 PROCEDURE — 6360000004 HC RX CONTRAST MEDICATION: Performed by: RADIOLOGY

## 2022-11-01 PROCEDURE — 36415 COLL VENOUS BLD VENIPUNCTURE: CPT

## 2022-11-01 PROCEDURE — 71260 CT THORAX DX C+: CPT

## 2022-11-01 RX ADMIN — IOPAMIDOL 75 ML: 755 INJECTION, SOLUTION INTRAVENOUS at 15:16

## 2023-06-06 ENCOUNTER — HOSPITAL ENCOUNTER (OUTPATIENT)
Dept: CT IMAGING | Age: 58
Discharge: HOME | End: 2023-06-08
Payer: COMMERCIAL

## 2023-06-06 DIAGNOSIS — C34.11 MALIGNANT NEOPLASM OF UPPER LOBE OF RIGHT LUNG (HCC): ICD-10-CM

## 2023-06-06 PROCEDURE — 71260 CT THORAX DX C+: CPT

## 2023-06-06 PROCEDURE — 6360000004 HC RX CONTRAST MEDICATION: Performed by: RADIOLOGY

## 2023-06-06 RX ADMIN — IOPAMIDOL 75 ML: 755 INJECTION, SOLUTION INTRAVENOUS at 15:16

## 2024-06-10 ENCOUNTER — HOSPITAL ENCOUNTER (OUTPATIENT)
Age: 59
Discharge: HOME | End: 2024-06-10
Payer: COMMERCIAL

## 2024-06-10 LAB
BUN SERPL-MCNC: 28 MG/DL (ref 6–23)
CREAT SERPL-MCNC: 0.9 MG/DL (ref 0.7–1.2)
GFR, ESTIMATED: >90 ML/MIN/1.73M2

## 2024-06-10 PROCEDURE — 84520 ASSAY OF UREA NITROGEN: CPT

## 2024-06-10 PROCEDURE — 82565 ASSAY OF CREATININE: CPT

## 2024-06-10 PROCEDURE — 36415 COLL VENOUS BLD VENIPUNCTURE: CPT

## 2024-06-14 ENCOUNTER — HOSPITAL ENCOUNTER (OUTPATIENT)
Dept: CT IMAGING | Age: 59
End: 2024-06-14
Attending: INTERNAL MEDICINE
Payer: COMMERCIAL

## 2024-06-14 DIAGNOSIS — C34.11 MALIGNANT NEOPLASM OF UPPER LOBE OF RIGHT LUNG (HCC): ICD-10-CM

## 2024-06-14 PROCEDURE — 71260 CT THORAX DX C+: CPT

## 2024-06-14 PROCEDURE — 6360000004 HC RX CONTRAST MEDICATION: Performed by: RADIOLOGY

## 2024-06-14 RX ORDER — SODIUM CHLORIDE 0.9 % (FLUSH) 0.9 %
10 SYRINGE (ML) INJECTION
Status: ACTIVE | OUTPATIENT
Start: 2024-06-14 | End: 2024-06-15

## 2024-06-14 RX ADMIN — IOPAMIDOL 75 ML: 755 INJECTION, SOLUTION INTRAVENOUS at 14:05

## 2024-10-25 ENCOUNTER — HOSPITAL ENCOUNTER (OUTPATIENT)
Age: 59
Discharge: HOME | End: 2024-10-25
Payer: COMMERCIAL

## 2024-10-25 LAB
BUN SERPL-MCNC: 14 MG/DL (ref 6–23)
CREAT SERPL-MCNC: 0.9 MG/DL (ref 0.7–1.2)
GFR, ESTIMATED: >90 ML/MIN/1.73M2

## 2024-10-25 PROCEDURE — 84520 ASSAY OF UREA NITROGEN: CPT

## 2024-10-25 PROCEDURE — 36415 COLL VENOUS BLD VENIPUNCTURE: CPT

## 2024-10-25 PROCEDURE — 82565 ASSAY OF CREATININE: CPT

## 2024-10-27 ENCOUNTER — HOSPITAL ENCOUNTER (OUTPATIENT)
Dept: CT IMAGING | Age: 59
Discharge: HOME | End: 2024-10-29
Attending: INTERNAL MEDICINE
Payer: COMMERCIAL

## 2024-10-27 DIAGNOSIS — C34.11 MALIGNANT NEOPLASM OF UPPER LOBE OF RIGHT LUNG (HCC): ICD-10-CM

## 2024-10-27 PROCEDURE — 6360000004 HC RX CONTRAST MEDICATION: Performed by: RADIOLOGY

## 2024-10-27 PROCEDURE — 71260 CT THORAX DX C+: CPT

## 2024-10-27 RX ORDER — IOPAMIDOL 755 MG/ML
75 INJECTION, SOLUTION INTRAVASCULAR
Status: COMPLETED | OUTPATIENT
Start: 2024-10-27 | End: 2024-10-27

## 2024-10-27 RX ADMIN — IOPAMIDOL 75 ML: 755 INJECTION, SOLUTION INTRAVENOUS at 10:12

## 2025-05-16 ENCOUNTER — TRANSCRIBE ORDERS (OUTPATIENT)
Dept: ADMINISTRATIVE | Age: 60
End: 2025-05-16

## 2025-05-16 DIAGNOSIS — C34.11 MALIGNANT NEOPLASM OF UPPER LOBE OF RIGHT LUNG (HCC): Primary | ICD-10-CM

## 2025-06-20 ENCOUNTER — HOSPITAL ENCOUNTER (OUTPATIENT)
Dept: CT IMAGING | Age: 60
Discharge: HOME | End: 2025-06-20
Attending: INTERNAL MEDICINE

## 2025-06-20 DIAGNOSIS — C34.11 MALIGNANT NEOPLASM OF UPPER LOBE OF RIGHT LUNG (HCC): ICD-10-CM

## 2025-06-24 ENCOUNTER — HOSPITAL ENCOUNTER (OUTPATIENT)
Age: 60
Discharge: HOME | End: 2025-06-24
Payer: COMMERCIAL

## 2025-06-24 ENCOUNTER — HOSPITAL ENCOUNTER (OUTPATIENT)
Dept: CT IMAGING | Age: 60
Discharge: HOME | End: 2025-06-26
Attending: INTERNAL MEDICINE
Payer: COMMERCIAL

## 2025-06-24 LAB
BUN SERPL-MCNC: 20 MG/DL (ref 8–23)
CREAT SERPL-MCNC: 0.9 MG/DL (ref 0.7–1.2)
GFR, ESTIMATED: >90 ML/MIN/1.73M2

## 2025-06-24 PROCEDURE — 84520 ASSAY OF UREA NITROGEN: CPT

## 2025-06-24 PROCEDURE — 6360000004 HC RX CONTRAST MEDICATION: Performed by: SPECIALIST

## 2025-06-24 PROCEDURE — 82565 ASSAY OF CREATININE: CPT

## 2025-06-24 PROCEDURE — 36415 COLL VENOUS BLD VENIPUNCTURE: CPT

## 2025-06-24 PROCEDURE — 71260 CT THORAX DX C+: CPT

## 2025-06-24 RX ORDER — IOPAMIDOL 755 MG/ML
75 INJECTION, SOLUTION INTRAVASCULAR
Status: COMPLETED | OUTPATIENT
Start: 2025-06-24 | End: 2025-06-24

## 2025-06-24 RX ADMIN — IOPAMIDOL 75 ML: 755 INJECTION, SOLUTION INTRAVENOUS at 14:22

## (undated) DEVICE — [HIGH FLOW INSUFFLATOR,  DO NOT USE IF PACKAGE IS DAMAGED,  KEEP DRY,  KEEP AWAY FROM SUNLIGHT,  PROTECT FROM HEAT AND RADIOACTIVE SOURCES.]: Brand: PNEUMOSURE

## (undated) DEVICE — BLADE CLIPPER GEN PURP NS

## (undated) DEVICE — STAPLER SKIN L39MM DIA0.53MM CRWN 5.7MM S STL FIX HD PROX

## (undated) DEVICE — INTENDED FOR TISSUE SEPARATION, AND OTHER PROCEDURES THAT REQUIRE A SHARP SURGICAL BLADE TO PUNCTURE OR CUT.: Brand: BARD-PARKER ® STAINLESS STEEL BLADES

## (undated) DEVICE — KIT SURG W7XL11IN 2 PKT UNTREATED NA

## (undated) DEVICE — TAPE ADH W2INXL10YD WHT PAPR GENTLE BRTH FLX COMFORTABLE

## (undated) DEVICE — AGENT HEMSTAT W4XL8IN OXIDIZED REGENERATED CELOS ABSRB

## (undated) DEVICE — Device

## (undated) DEVICE — GLOVE ORANGE PI 8   MSG9080

## (undated) DEVICE — GLOVE SURG SZ 6 THK91MIL LTX FREE SYN POLYISOPRENE ANTI

## (undated) DEVICE — ADHESIVE SKIN CLOSURE TOP 36 CC HI VISC DERMBND MINI

## (undated) DEVICE — STANDARD HYPODERMIC NEEDLE,ALUMINUM HUB: Brand: MONOJECT

## (undated) DEVICE — MARKER,SKIN,WI/RULER AND LABELS: Brand: MEDLINE

## (undated) DEVICE — MARYLAND BIPOLAR FORCEPS: Brand: ENDOWRIST

## (undated) DEVICE — SURGICAL PROCEDURE PACK BASIC

## (undated) DEVICE — PUMP SUC IRR TBNG L10FT W/ HNDPC ASSEMB STRYKEFLOW 2

## (undated) DEVICE — ROBOTIC THORACIC EXTRAS

## (undated) DEVICE — DRAPE THER FLUID WARMING 66X44 IN FLAT SLUSH DBL DISC ORS

## (undated) DEVICE — TUBING 1912030 ENDO-SCRUB 2 5PK: Brand: ENDO-SCRUB®

## (undated) DEVICE — TOWEL,OR,DSP,ST,BLUE,STD,6/PK,12PK/CS: Brand: MEDLINE

## (undated) DEVICE — GLOVE SURG SZ 65 THK91MIL LTX FREE SYN POLYISOPRENE

## (undated) DEVICE — SOLUTION IV IRRIG POUR BRL 0.9% SODIUM CHL 2F7124

## (undated) DEVICE — CADIERE FORCEPS: Brand: ENDOWRIST

## (undated) DEVICE — TIP-UP FENESTRATED GRASPER: Brand: ENDOWRIST

## (undated) DEVICE — SET SINUS SCOPE

## (undated) DEVICE — SHEET,DRAPE,40X58,STERILE: Brand: MEDLINE

## (undated) DEVICE — 3M™ STERI-DRAPE™ INCISE DRAPE 1050 (60CM X 45CM): Brand: STERI-DRAPE™

## (undated) DEVICE — STAPLER 60: Brand: SUREFORM

## (undated) DEVICE — CANNULA STAPLER ENDOWRIST 12MM

## (undated) DEVICE — NEEDLE SPNL 20GA L3.5IN YEL HUB S STL REG WALL FIT STYL W/

## (undated) DEVICE — SYRINGE MED 50ML LUERLOCK TIP

## (undated) DEVICE — SOLUTION IV IRRIG WATER 1000ML POUR BRL 2F7114

## (undated) DEVICE — COLUMN DRAPE

## (undated) DEVICE — TISSUE RETRIEVAL SYSTEM: Brand: INZII RETRIEVAL SYSTEM

## (undated) DEVICE — CANNULA SEAL

## (undated) DEVICE — PUMP SUCT THOR THOPAZ

## (undated) DEVICE — TIP APPL L16CM TISS GLUE EXT SPR FOR PLEUR AIR LEAK PROGEL

## (undated) DEVICE — MARKER,SKIN,PREP-RESISTANT,NON-STERILE: Brand: MEDLINE

## (undated) DEVICE — COUNTER NDL 30 COUNT DBL MAG

## (undated) DEVICE — SYRINGE MED 10ML POLYPR LUERSLIP TIP FLAT TOP W/O SFTY DISP

## (undated) DEVICE — STAPLER 30 RELOAD WHITE: Brand: ENDOWRIST

## (undated) DEVICE — AIRSEAL 12 MM ACCESS PORT AND PALM GRIP OBTURATOR WITH BLADELESS OPTICAL TIP, 100 MM LENGTH: Brand: AIRSEAL

## (undated) DEVICE — TOTAL TRAY, 16FR 10ML SIL FOLEY, URN: Brand: MEDLINE

## (undated) DEVICE — SHEATH 1912010 5PK 4MM/30DEG STORZ XOMED: Brand: ENDO-SCRUB®

## (undated) DEVICE — DEVICE INFL PRSS G INDIC DISP (MUST BE PURC IN MULTIPLES OF 5)

## (undated) DEVICE — GLOVE ORANGE PI 7   MSG9070

## (undated) DEVICE — INSUFFLATION NEEDLE TO ESTABLISH PNEUMOPERITONEUM.: Brand: INSUFFLATION NEEDLE

## (undated) DEVICE — REFLEX ULTRA 45 WITH INTEGRATED CABLE: Brand: COBLATION

## (undated) DEVICE — RETAINER 16IN ADJ EAR LOOP DRSG NSL NS

## (undated) DEVICE — SURGICAL PROCEDURE PACK EENT CUST

## (undated) DEVICE — TRI-LUMEN FILTERED TUBE SET WITH ACTIVATED CHARCOAL FILTER: Brand: AIRSEAL

## (undated) DEVICE — CURVED-TIP STAPLER 30: Brand: ENDOWRIST

## (undated) DEVICE — STAPLER SHEATH: Brand: ENDOWRIST

## (undated) DEVICE — PAD,NON-ADHERENT,3X8,STERILE,LF,1/PK: Brand: MEDLINE

## (undated) DEVICE — CONTROL SYRINGE LUER-LOCK TIP: Brand: MONOJECT

## (undated) DEVICE — GOWN,SIRUS,FABRNF,L,20/CS: Brand: MEDLINE

## (undated) DEVICE — GOWN,SIRUS,FABRNF,XL,20/CS: Brand: MEDLINE

## (undated) DEVICE — LABEL MED 4 IN SURG PANEL W/ PEN STRL

## (undated) DEVICE — STAPLER 60 RELOAD BLACK: Brand: SUREFORM

## (undated) DEVICE — GLOVE ORANGE PI 7 1/2   MSG9075

## (undated) DEVICE — REDUCER: Brand: ENDOWRIST

## (undated) DEVICE — APPLICATOR MEDICATED 26 CC SOLUTION HI LT ORNG CHLORAPREP

## (undated) DEVICE — GLOVE SURG SZ 7.5 L11.73IN FNGR THK9.8MIL STRW LTX POLYMER

## (undated) DEVICE — CAMERA STRYKER 1488 HD GEN

## (undated) DEVICE — PAD,NON-ADHERENT,2X3,STERILE,LF,1/PK: Brand: MEDLINE

## (undated) DEVICE — CODMAN® SURGICAL PATTIES 1/2" X 3" (1.27CM X 7.62CM): Brand: CODMAN®

## (undated) DEVICE — LOOP VES W25MM THK1MM MAXI RED SIL FLD REPELLENT 100 PER

## (undated) DEVICE — BALLOON SINUPLASTY 6X16 MM SYS RELIEVA SPINPLUS

## (undated) DEVICE — PATIENT RETURN ELECTRODE, SINGLE-USE, CONTACT QUALITY MONITORING, ADULT, WITH 9FT CORD, FOR PATIENTS WEIGING OVER 33LBS. (15KG): Brand: MEGADYNE

## (undated) DEVICE — CABLE LT L8FT SNUS INTVENT 2 GUID SAFE DST FLX TIP RELIEVA

## (undated) DEVICE — SEAL

## (undated) DEVICE — SCOPE DAVINCI XI 30 DEG W/CORD

## (undated) DEVICE — NEEDLE SPNL 22GA L3.5IN BLK HUB S STL REG WALL FIT STYL W/

## (undated) DEVICE — DAVINCI THORACIC INSTRUMENT SET

## (undated) DEVICE — GARMENT,MEDLINE,DVT,INT,CALF,MED, GEN2: Brand: MEDLINE

## (undated) DEVICE — SET INST DAVINCI ACCESSORIES

## (undated) DEVICE — SPLINT 1524055 DOYLE II AIRWAY SET: Brand: DOYLE II ™

## (undated) DEVICE — SET INSTR NASAL HOUSE

## (undated) DEVICE — DOUBLE BASIN SET: Brand: MEDLINE INDUSTRIES, INC.

## (undated) DEVICE — 1.5L THIN WALL CAN: Brand: CRD

## (undated) DEVICE — 3M™ MEDIPORE™ + PAD 3564: Brand: 3M™ MEDIPORE™

## (undated) DEVICE — SEALANT TISS GLUE 4ML PLEUR AIR LEAK PROGEL

## (undated) DEVICE — TAPE ADH W2INXL10YD PLAS TRNSPAR H2O RESIST HYPOALRG CURAD

## (undated) DEVICE — PACK,UNIVERSAL,NO GOWNS: Brand: MEDLINE

## (undated) DEVICE — MAGNETIC INSTR DRAPE 20X16: Brand: MEDLINE INDUSTRIES, INC.

## (undated) DEVICE — E-Z CLEAN, NON-STICK, PTFE COATED, ELECTROSURGICAL BLADE ELECTRODE, MODIFIED EXTENDED INSULATION, 2.5 INCH (6.35 CM): Brand: MEGADYNE

## (undated) DEVICE — CLOTH SURG PREP PREOPERATIVE CHLORHEXIDINE GLUC 2% READYPREP

## (undated) DEVICE — SYRINGE MED 10ML SLIP TIP BLNT FILL AND LUERLOCK DISP

## (undated) DEVICE — SYRINGE 20ML LL S/C 50

## (undated) DEVICE — SOLUTION IV 1000ML 0.9% SOD CHL PH 5 INJ USP VIAFLX PLAS

## (undated) DEVICE — NEEDLE SPNL 25GA L3.5IN BLU HUB S STL REG WALL FIT STYL W/

## (undated) DEVICE — CURITY FLEXIBLE ADHESIVE BANDAGE: Brand: CURITY

## (undated) DEVICE — ARM DRAPE

## (undated) DEVICE — GLOVE ORANGE PI 8 1/2   MSG9085

## (undated) DEVICE — BLADELESS OBTURATOR: Brand: WECK VISTA

## (undated) DEVICE — TIP APPL L29CM TISS GLUE EXT SPR FOR PLEUR AIR LEAK PROGEL

## (undated) DEVICE — TUBING, SUCTION, 3/16" X 12', STRAIGHT: Brand: MEDLINE

## (undated) DEVICE — ENTACT SEPTAL STAPLER 3 PACK: Brand: ENT SINUS

## (undated) DEVICE — POUCH, INSTRUMENT, 3POCKET, INVISISHIELD: Brand: MEDLINE

## (undated) DEVICE — ALCOHOL 70% RUBBING 16OZ

## (undated) DEVICE — COVER SPONGES: Brand: CURITY